# Patient Record
Sex: FEMALE | Race: WHITE | NOT HISPANIC OR LATINO | Employment: UNEMPLOYED | URBAN - METROPOLITAN AREA
[De-identification: names, ages, dates, MRNs, and addresses within clinical notes are randomized per-mention and may not be internally consistent; named-entity substitution may affect disease eponyms.]

---

## 2021-09-11 ENCOUNTER — APPOINTMENT (OUTPATIENT)
Dept: GENERAL RADIOLOGY | Facility: CLINIC | Age: 56
End: 2021-09-11
Attending: EMERGENCY MEDICINE
Payer: MEDICAID

## 2021-09-11 ENCOUNTER — HOSPITAL ENCOUNTER (INPATIENT)
Facility: CLINIC | Age: 56
LOS: 6 days | Discharge: HOME HEALTH | End: 2021-09-17
Attending: EMERGENCY MEDICINE | Admitting: INTERNAL MEDICINE
Payer: MEDICAID

## 2021-09-11 ENCOUNTER — APPOINTMENT (OUTPATIENT)
Dept: ULTRASOUND IMAGING | Facility: CLINIC | Age: 56
End: 2021-09-11
Attending: EMERGENCY MEDICINE
Payer: MEDICAID

## 2021-09-11 DIAGNOSIS — D64.9 ANEMIA, UNSPECIFIED TYPE: ICD-10-CM

## 2021-09-11 DIAGNOSIS — Z76.89 HEALTH CARE HOME: ICD-10-CM

## 2021-09-11 DIAGNOSIS — R18.8 ASCITES OF LIVER: ICD-10-CM

## 2021-09-11 DIAGNOSIS — F10.10 ALCOHOL ABUSE: Primary | ICD-10-CM

## 2021-09-11 DIAGNOSIS — R17 JAUNDICE: ICD-10-CM

## 2021-09-11 DIAGNOSIS — E87.1 HYPONATREMIA: ICD-10-CM

## 2021-09-11 LAB
ALBUMIN SERPL-MCNC: 1.8 G/DL (ref 3.4–5)
ALP SERPL-CCNC: 207 U/L (ref 40–150)
ALT SERPL W P-5'-P-CCNC: 26 U/L (ref 0–50)
AMMONIA PLAS-SCNC: 34 UMOL/L (ref 10–50)
ANION GAP SERPL CALCULATED.3IONS-SCNC: 10 MMOL/L (ref 3–14)
AST SERPL W P-5'-P-CCNC: 83 U/L (ref 0–45)
BASOPHILS # BLD AUTO: 0 10E3/UL (ref 0–0.2)
BASOPHILS NFR BLD AUTO: 0 %
BILIRUB DIRECT SERPL-MCNC: 3.8 MG/DL (ref 0–0.2)
BILIRUB SERPL-MCNC: 6.5 MG/DL (ref 0.2–1.3)
BILIRUB SERPL-MCNC: 6.6 MG/DL (ref 0.2–1.3)
BUN SERPL-MCNC: 7 MG/DL (ref 7–30)
CALCIUM SERPL-MCNC: 8 MG/DL (ref 8.5–10.1)
CHLORIDE BLD-SCNC: 86 MMOL/L (ref 94–109)
CO2 SERPL-SCNC: 21 MMOL/L (ref 20–32)
CREAT SERPL-MCNC: 0.76 MG/DL (ref 0.52–1.04)
EOSINOPHIL # BLD AUTO: 0 10E3/UL (ref 0–0.7)
EOSINOPHIL NFR BLD AUTO: 0 %
ERYTHROCYTE [DISTWIDTH] IN BLOOD BY AUTOMATED COUNT: 15.8 % (ref 10–15)
ETHANOL SERPL-MCNC: <0.01 G/DL
FERRITIN SERPL-MCNC: 2860 NG/ML (ref 8–252)
GFR SERPL CREATININE-BSD FRML MDRD: 89 ML/MIN/1.73M2
GLUCOSE BLD-MCNC: 101 MG/DL (ref 70–99)
HCT VFR BLD AUTO: 24.5 % (ref 35–47)
HGB BLD-MCNC: 8.7 G/DL (ref 11.7–15.7)
HOLD SPECIMEN: NORMAL
IMM GRANULOCYTES # BLD: 0.1 10E3/UL
IMM GRANULOCYTES NFR BLD: 1 %
INR PPP: 1.53 (ref 0.85–1.15)
IRON SATN MFR SERPL: 101 % (ref 15–46)
IRON SERPL-MCNC: 111 UG/DL (ref 35–180)
LIPASE SERPL-CCNC: 256 U/L (ref 73–393)
LYMPHOCYTES # BLD AUTO: 0.7 10E3/UL (ref 0.8–5.3)
LYMPHOCYTES NFR BLD AUTO: 5 %
MAGNESIUM SERPL-MCNC: 2.1 MG/DL (ref 1.6–2.3)
MCH RBC QN AUTO: 37.3 PG (ref 26.5–33)
MCHC RBC AUTO-ENTMCNC: 35.5 G/DL (ref 31.5–36.5)
MCV RBC AUTO: 105 FL (ref 78–100)
MONOCYTES # BLD AUTO: 0.7 10E3/UL (ref 0–1.3)
MONOCYTES NFR BLD AUTO: 6 %
NEUTROPHILS # BLD AUTO: 11.5 10E3/UL (ref 1.6–8.3)
NEUTROPHILS NFR BLD AUTO: 88 %
NRBC # BLD AUTO: 0 10E3/UL
NRBC BLD AUTO-RTO: 0 /100
NT-PROBNP SERPL-MCNC: 584 PG/ML (ref 0–900)
PHOSPHATE SERPL-MCNC: 2.2 MG/DL (ref 2.5–4.5)
PLATELET # BLD AUTO: 200 10E3/UL (ref 150–450)
POTASSIUM BLD-SCNC: 3.9 MMOL/L (ref 3.4–5.3)
PROT SERPL-MCNC: 8.1 G/DL (ref 6.8–8.8)
RBC # BLD AUTO: 2.33 10E6/UL (ref 3.8–5.2)
SARS-COV-2 RNA RESP QL NAA+PROBE: NEGATIVE
SODIUM SERPL-SCNC: 117 MMOL/L (ref 133–144)
T4 FREE SERPL-MCNC: 1.14 NG/DL (ref 0.76–1.46)
TIBC SERPL-MCNC: 110 UG/DL (ref 240–430)
TROPONIN I SERPL-MCNC: <0.015 UG/L (ref 0–0.04)
TSH SERPL DL<=0.005 MIU/L-ACNC: 4.64 MU/L (ref 0.4–4)
WBC # BLD AUTO: 13 10E3/UL (ref 4–11)

## 2021-09-11 PROCEDURE — 84439 ASSAY OF FREE THYROXINE: CPT | Performed by: INTERNAL MEDICINE

## 2021-09-11 PROCEDURE — 82077 ASSAY SPEC XCP UR&BREATH IA: CPT | Performed by: NURSE PRACTITIONER

## 2021-09-11 PROCEDURE — 84484 ASSAY OF TROPONIN QUANT: CPT | Performed by: NURSE PRACTITIONER

## 2021-09-11 PROCEDURE — 99285 EMERGENCY DEPT VISIT HI MDM: CPT | Mod: 25

## 2021-09-11 PROCEDURE — 84100 ASSAY OF PHOSPHORUS: CPT | Performed by: INTERNAL MEDICINE

## 2021-09-11 PROCEDURE — 93005 ELECTROCARDIOGRAM TRACING: CPT

## 2021-09-11 PROCEDURE — 85045 AUTOMATED RETICULOCYTE COUNT: CPT | Performed by: INTERNAL MEDICINE

## 2021-09-11 PROCEDURE — 258N000003 HC RX IP 258 OP 636: Performed by: EMERGENCY MEDICINE

## 2021-09-11 PROCEDURE — 84295 ASSAY OF SERUM SODIUM: CPT | Performed by: INTERNAL MEDICINE

## 2021-09-11 PROCEDURE — 83880 ASSAY OF NATRIURETIC PEPTIDE: CPT | Performed by: NURSE PRACTITIONER

## 2021-09-11 PROCEDURE — 84443 ASSAY THYROID STIM HORMONE: CPT | Performed by: INTERNAL MEDICINE

## 2021-09-11 PROCEDURE — 85610 PROTHROMBIN TIME: CPT | Performed by: EMERGENCY MEDICINE

## 2021-09-11 PROCEDURE — C9803 HOPD COVID-19 SPEC COLLECT: HCPCS

## 2021-09-11 PROCEDURE — 96360 HYDRATION IV INFUSION INIT: CPT

## 2021-09-11 PROCEDURE — 84165 PROTEIN E-PHORESIS SERUM: CPT | Mod: TC | Performed by: PATHOLOGY

## 2021-09-11 PROCEDURE — 82728 ASSAY OF FERRITIN: CPT | Performed by: INTERNAL MEDICINE

## 2021-09-11 PROCEDURE — 82140 ASSAY OF AMMONIA: CPT | Performed by: EMERGENCY MEDICINE

## 2021-09-11 PROCEDURE — 93970 EXTREMITY STUDY: CPT

## 2021-09-11 PROCEDURE — 80053 COMPREHEN METABOLIC PANEL: CPT | Performed by: NURSE PRACTITIONER

## 2021-09-11 PROCEDURE — 36415 COLL VENOUS BLD VENIPUNCTURE: CPT | Performed by: INTERNAL MEDICINE

## 2021-09-11 PROCEDURE — 83550 IRON BINDING TEST: CPT | Performed by: INTERNAL MEDICINE

## 2021-09-11 PROCEDURE — 85025 COMPLETE CBC W/AUTO DIFF WBC: CPT | Performed by: INTERNAL MEDICINE

## 2021-09-11 PROCEDURE — 120N000001 HC R&B MED SURG/OB

## 2021-09-11 PROCEDURE — 76700 US EXAM ABDOM COMPLETE: CPT

## 2021-09-11 PROCEDURE — 99223 1ST HOSP IP/OBS HIGH 75: CPT | Mod: AI | Performed by: INTERNAL MEDICINE

## 2021-09-11 PROCEDURE — 36415 COLL VENOUS BLD VENIPUNCTURE: CPT | Performed by: NURSE PRACTITIONER

## 2021-09-11 PROCEDURE — 82248 BILIRUBIN DIRECT: CPT | Performed by: INTERNAL MEDICINE

## 2021-09-11 PROCEDURE — 84155 ASSAY OF PROTEIN SERUM: CPT | Performed by: INTERNAL MEDICINE

## 2021-09-11 PROCEDURE — 82607 VITAMIN B-12: CPT | Performed by: INTERNAL MEDICINE

## 2021-09-11 PROCEDURE — 85041 AUTOMATED RBC COUNT: CPT | Performed by: NURSE PRACTITIONER

## 2021-09-11 PROCEDURE — HZ2ZZZZ DETOXIFICATION SERVICES FOR SUBSTANCE ABUSE TREATMENT: ICD-10-PCS | Performed by: INTERNAL MEDICINE

## 2021-09-11 PROCEDURE — 83690 ASSAY OF LIPASE: CPT | Performed by: NURSE PRACTITIONER

## 2021-09-11 PROCEDURE — 82746 ASSAY OF FOLIC ACID SERUM: CPT | Performed by: INTERNAL MEDICINE

## 2021-09-11 PROCEDURE — 87635 SARS-COV-2 COVID-19 AMP PRB: CPT | Performed by: EMERGENCY MEDICINE

## 2021-09-11 PROCEDURE — 71045 X-RAY EXAM CHEST 1 VIEW: CPT

## 2021-09-11 PROCEDURE — 83735 ASSAY OF MAGNESIUM: CPT | Performed by: INTERNAL MEDICINE

## 2021-09-11 PROCEDURE — 83930 ASSAY OF BLOOD OSMOLALITY: CPT | Performed by: INTERNAL MEDICINE

## 2021-09-11 RX ORDER — ONDANSETRON 4 MG/1
4 TABLET, ORALLY DISINTEGRATING ORAL EVERY 6 HOURS PRN
Status: DISCONTINUED | OUTPATIENT
Start: 2021-09-11 | End: 2021-09-17 | Stop reason: HOSPADM

## 2021-09-11 RX ORDER — AMOXICILLIN 250 MG
2 CAPSULE ORAL 2 TIMES DAILY PRN
Status: DISCONTINUED | OUTPATIENT
Start: 2021-09-11 | End: 2021-09-17 | Stop reason: HOSPADM

## 2021-09-11 RX ORDER — FLUMAZENIL 0.1 MG/ML
0.2 INJECTION, SOLUTION INTRAVENOUS
Status: DISCONTINUED | OUTPATIENT
Start: 2021-09-11 | End: 2021-09-17 | Stop reason: HOSPADM

## 2021-09-11 RX ORDER — AMOXICILLIN 250 MG
1 CAPSULE ORAL 2 TIMES DAILY PRN
Status: DISCONTINUED | OUTPATIENT
Start: 2021-09-11 | End: 2021-09-17 | Stop reason: HOSPADM

## 2021-09-11 RX ORDER — DIAZEPAM 5 MG
10 TABLET ORAL EVERY 30 MIN PRN
Status: DISCONTINUED | OUTPATIENT
Start: 2021-09-11 | End: 2021-09-17 | Stop reason: HOSPADM

## 2021-09-11 RX ORDER — ACETAMINOPHEN 650 MG/1
650 SUPPOSITORY RECTAL EVERY 6 HOURS PRN
Status: DISCONTINUED | OUTPATIENT
Start: 2021-09-11 | End: 2021-09-17 | Stop reason: HOSPADM

## 2021-09-11 RX ORDER — ONDANSETRON 2 MG/ML
4 INJECTION INTRAMUSCULAR; INTRAVENOUS EVERY 6 HOURS PRN
Status: DISCONTINUED | OUTPATIENT
Start: 2021-09-11 | End: 2021-09-17 | Stop reason: HOSPADM

## 2021-09-11 RX ORDER — LANOLIN ALCOHOL/MO/W.PET/CERES
3 CREAM (GRAM) TOPICAL
Status: DISCONTINUED | OUTPATIENT
Start: 2021-09-11 | End: 2021-09-11

## 2021-09-11 RX ORDER — MULTIPLE VITAMINS W/ MINERALS TAB 9MG-400MCG
1 TAB ORAL DAILY
Status: DISCONTINUED | OUTPATIENT
Start: 2021-09-12 | End: 2021-09-17 | Stop reason: HOSPADM

## 2021-09-11 RX ORDER — POLYETHYLENE GLYCOL 3350 17 G/17G
17 POWDER, FOR SOLUTION ORAL DAILY PRN
Status: DISCONTINUED | OUTPATIENT
Start: 2021-09-11 | End: 2021-09-17 | Stop reason: HOSPADM

## 2021-09-11 RX ORDER — SODIUM CHLORIDE 9 MG/ML
INJECTION, SOLUTION INTRAVENOUS CONTINUOUS
Status: DISCONTINUED | OUTPATIENT
Start: 2021-09-11 | End: 2021-09-11

## 2021-09-11 RX ORDER — FOLIC ACID 1 MG/1
1 TABLET ORAL DAILY
Status: DISCONTINUED | OUTPATIENT
Start: 2021-09-12 | End: 2021-09-17 | Stop reason: HOSPADM

## 2021-09-11 RX ORDER — LANOLIN ALCOHOL/MO/W.PET/CERES
100 CREAM (GRAM) TOPICAL DAILY
Status: COMPLETED | OUTPATIENT
Start: 2021-09-12 | End: 2021-09-16

## 2021-09-11 RX ORDER — DIAZEPAM 10 MG/2ML
5-10 INJECTION, SOLUTION INTRAMUSCULAR; INTRAVENOUS EVERY 30 MIN PRN
Status: DISCONTINUED | OUTPATIENT
Start: 2021-09-11 | End: 2021-09-17 | Stop reason: HOSPADM

## 2021-09-11 RX ORDER — PROCHLORPERAZINE MALEATE 10 MG
10 TABLET ORAL EVERY 6 HOURS PRN
Status: DISCONTINUED | OUTPATIENT
Start: 2021-09-11 | End: 2021-09-17 | Stop reason: HOSPADM

## 2021-09-11 RX ORDER — ACETAMINOPHEN 325 MG/1
650 TABLET ORAL EVERY 6 HOURS PRN
Status: DISCONTINUED | OUTPATIENT
Start: 2021-09-11 | End: 2021-09-17 | Stop reason: HOSPADM

## 2021-09-11 RX ORDER — LIDOCAINE 40 MG/G
CREAM TOPICAL
Status: DISCONTINUED | OUTPATIENT
Start: 2021-09-11 | End: 2021-09-15

## 2021-09-11 RX ORDER — BISACODYL 10 MG
10 SUPPOSITORY, RECTAL RECTAL DAILY PRN
Status: DISCONTINUED | OUTPATIENT
Start: 2021-09-11 | End: 2021-09-17 | Stop reason: HOSPADM

## 2021-09-11 RX ORDER — PROCHLORPERAZINE 25 MG
25 SUPPOSITORY, RECTAL RECTAL EVERY 12 HOURS PRN
Status: DISCONTINUED | OUTPATIENT
Start: 2021-09-11 | End: 2021-09-17 | Stop reason: HOSPADM

## 2021-09-11 RX ADMIN — SODIUM CHLORIDE 1000 ML: 9 INJECTION, SOLUTION INTRAVENOUS at 19:05

## 2021-09-11 ASSESSMENT — ACTIVITIES OF DAILY LIVING (ADL)
DRESSING/BATHING_DIFFICULTY: NO
PATIENT_/_FAMILY_COMMUNICATION_STYLE: SPOKEN LANGUAGE (ENGLISH OR BILINGUAL)
FALL_HISTORY_WITHIN_LAST_SIX_MONTHS: YES
TOILETING_ISSUES: NO
DIFFICULTY_COMMUNICATING: NO
VISION_MANAGEMENT: GLASSES
DIFFICULTY_EATING/SWALLOWING: NO
CONCENTRATING,_REMEMBERING_OR_MAKING_DECISIONS_DIFFICULTY: NO
NUMBER_OF_TIMES_PATIENT_HAS_FALLEN_WITHIN_LAST_SIX_MONTHS: 2
DOING_ERRANDS_INDEPENDENTLY_DIFFICULTY: NO
WHICH_OF_THE_ABOVE_FUNCTIONAL_RISKS_HAD_A_RECENT_ONSET_OR_CHANGE?: AMBULATION
WALKING_OR_CLIMBING_STAIRS_DIFFICULTY: NO
HEARING_DIFFICULTY_OR_DEAF: NO
WEAR_GLASSES_OR_BLIND: YES

## 2021-09-11 ASSESSMENT — ENCOUNTER SYMPTOMS
WEAKNESS: 1
ABDOMINAL PAIN: 0
ABDOMINAL DISTENTION: 1
COUGH: 1
VOMITING: 0
DIARRHEA: 0

## 2021-09-11 ASSESSMENT — MIFFLIN-ST. JEOR: SCORE: 1342.51

## 2021-09-11 NOTE — ED TRIAGE NOTES
"Pt here with her s/o for abd bloating, occasional cough, vision changes for a month and a half, weakness and bilateral leg swelling . Pt stated  She does drink \" more than she should \" told me she cut way back and now has 4 beers a day .   "

## 2021-09-11 NOTE — ED PROVIDER NOTES
History   Chief Complaint:  Leg swelling and abdominal distention      HPI   Danyelle Bustillo is a 55 year old female who presents with leg swelling and abdominal distention. The patient states that she has been experiencing leg swelling mainly on her left leg for the past 2 months. She mentions that the swelling reduces when she elevates her legs on her bed. She also states that she has been feeling weakness in her legs. The patient had a recent fall while picking something up and lost her balance. Denies any head injuries or syncope from the fall. She has also been experiencing abdomen distention for the past 3-4 weeks. She has had a minimal cough for the past 2 weeks. The patient also mentions that she has been having visual disturbances when she she steps inside and states that her eyes have a hard time adjusting after being outside. Denies any abdominal pain, vomiting, diarrhea, or tobacco use. Patient states that she was a heavy alcohol drinker but has reduced her consumption to 3-4 beers a day. Denies any liver issues.     Review of Systems   Eyes: Positive for visual disturbance.   Respiratory: Positive for cough.    Cardiovascular: Positive for leg swelling.   Gastrointestinal: Positive for abdominal distention. Negative for abdominal pain, diarrhea and vomiting.   Neurological: Positive for weakness. Negative for syncope.   All other systems reviewed and are negative.    Allergies:  The patient has no known allergies.     Medications:  The patient is not currently taking any prescribed medications.    Past Medical History:    The patient denies past medical history      Social History:  Patient presents with . Denies tobacco use. Patient does drink 3-4 beers per day    Physical Exam     Patient Vitals for the past 24 hrs:   BP Temp Temp src Pulse Resp SpO2 Height Weight   09/11/21 2000 112/64 -- -- (!) 121 23 94 % -- --   09/11/21 1930 112/62 -- -- 117 21 98 % -- --   09/11/21 1900 130/77 -- -- (!)  "123 -- 97 % -- --   09/11/21 1850 (!) 126/90 -- -- (!) 126 -- 100 % -- --   09/11/21 1700 129/84 -- -- (!) 129 -- 96 % -- --   09/11/21 1612 133/80 97.2  F (36.2  C) Temporal (!) 132 18 100 % 1.676 m (5' 6\") 72.6 kg (160 lb)       Physical Exam  Nursing note and vitals reviewed.  Constitutional:  Appears well-developed and well-nourished.   HENT:   Head:    Atraumatic.   Mouth/Throat:   Oropharynx is clear and moist. No oropharyngeal exudate.   Eyes:    Pupils are equal, round, and reactive to light. Positive scleral icterus   Neck:    Normal range of motion. Neck supple.      No tracheal deviation present. No thyromegaly present.   Cardiovascular:  Normal rate, regular rhythm, no murmur   Pulmonary/Chest: Breath sounds are clear and equal without wheezes or crackles.  Abdominal:   Abdomen is moderatlely distended but not tense. No palpable mass or organ enlargement. There is no tenderness.       There is no rebound and no guarding.   Musculoskeletal:  1+ pitting edema to right leg. 2+ pitting edema to left leg.   Lymphadenopathy:  No cervical adenopathy.   Neurological:   Alert and oriented to person, place, and time.   Skin:    Mild jaundice. Skin is warm and dry. No rash noted. No pallor.     Emergency Department Course   ECG  ECG taken at 1735, ECG read at 1749  Sinus tachycardia  Low voltage QRS  Nonspecific T wave abnormality    Rate 123 bpm. MT interval 132 ms. QRS duration 74 ms. QT/QTc 316/452 ms. P-R-T axes 56 51 14.     Imaging:  Abdomen US   1.  Fatty infiltration liver with nodular liver contour concerning for  cirrhosis. Moderate ascites.     2.  Cholelithiasis with gallbladder wall thickening, nonspecific in  the setting of ascites. Follow-up hepatobiliary scan could be  performed to assess for cystic and common bile duct patency.     Per radiology     US lower extremity venous  No deep venous thrombosis in the bilateral lower extremities.  Per radiology     XR chest port  Portable chest. Lungs are " clear. Heart is normal in size.  No pneumothorax. No definite pleural effusions  Per radiology     Laboratory:  Alcohol level: <0.01    Symptomatic Influenza A/B & SARS-CoV2 (COVID19) Virus PCR Multiplex: negative     Ammonia: 34    Troponin (Collected 1725): <0.015    BNP: 584    Lipase: 256    INR: 1.53 (H)    CMP: sodium (L), chloride 86 (L), chinmay 8 (L), glucose 101 (H), alkaline phosphatase 207 (H), AST 83 (H), albumin 1.8 (L), bilirubin 6.6 (H) o/w WNL (Creatinine 0.76)    CBC: WBC 13 (H), HGB 8.7 (L),      Emergency Department Course:    Reviewed:  I reviewed nursing notes, vitals and past medical history    Assessments:  1707 I obtained history and examined the patient as noted above.   1932 I rechecked the patient and explained findings.     Consults:   1940 I spoke with Dr. Ray, hospitalist, about the patient's findings and will be accepting the patient for admission.     Interventions:  1905 Sodium chloride 0.9 % infusion, 1000 mL, IV    Disposition:  The patient was admitted to the hospital under the care of Dr. Ray.       Impression & Plan     Medical Decision Making:  I found this patient to have significant hyponatremia as well as jaundice and volume ascites which is of unclear etiology at this point, but could be due to alcoholic liver disease/cirrhosis or alternative cause.  She was placed on a low maintenance rate of normal saline for treatment for her hyponatremia and admitted under the care of the hospitalist Dr. Ray for further evaluation and treatment.  There was no sign of bacterial infection or sepsis at this time.  She has anemia but this is likely chronic and not due to any active bleeding.    Covid-19  Danyelle Bustillo was evaluated during a global COVID-19 pandemic, which necessitated consideration that the patient might be at risk for infection with the SARS-CoV-2 virus that causes COVID-19.   Applicable protocols for evaluation were followed during the patient's  care.     Diagnosis:    ICD-10-CM    1. Hyponatremia  E87.1    2. Ascites of liver  R18.8    3. Jaundice  R17    4. Anemia, unspecified type  D64.9        Scribe Disclosure:  I, Elysia Lacey, am serving as a scribe at 5:07 PM on 9/11/2021 to document services personally performed by Jessica Moffett MD based on my observations and the provider's statements to me.        Jessica Moffett MD  09/12/21 0034

## 2021-09-12 ENCOUNTER — ANESTHESIA (OUTPATIENT)
Dept: MEDSURG UNIT | Facility: CLINIC | Age: 56
End: 2021-09-12
Payer: MEDICAID

## 2021-09-12 ENCOUNTER — APPOINTMENT (OUTPATIENT)
Dept: PHYSICAL THERAPY | Facility: CLINIC | Age: 56
End: 2021-09-12
Attending: INTERNAL MEDICINE
Payer: MEDICAID

## 2021-09-12 ENCOUNTER — ANESTHESIA EVENT (OUTPATIENT)
Dept: MEDSURG UNIT | Facility: CLINIC | Age: 56
End: 2021-09-12
Payer: MEDICAID

## 2021-09-12 ENCOUNTER — APPOINTMENT (OUTPATIENT)
Dept: OCCUPATIONAL THERAPY | Facility: CLINIC | Age: 56
End: 2021-09-12
Attending: INTERNAL MEDICINE
Payer: MEDICAID

## 2021-09-12 LAB
ALBUMIN SERPL-MCNC: 1.4 G/DL (ref 3.4–5)
ALP SERPL-CCNC: 168 U/L (ref 40–150)
ALT SERPL W P-5'-P-CCNC: 20 U/L (ref 0–50)
ANION GAP SERPL CALCULATED.3IONS-SCNC: 10 MMOL/L (ref 3–14)
AST SERPL W P-5'-P-CCNC: 69 U/L (ref 0–45)
ATRIAL RATE - MUSE: 123 BPM
BASOPHILS # BLD AUTO: 0 10E3/UL (ref 0–0.2)
BASOPHILS NFR BLD AUTO: 0 %
BILIRUB SERPL-MCNC: 5.7 MG/DL (ref 0.2–1.3)
BUN SERPL-MCNC: 7 MG/DL (ref 7–30)
CALCIUM SERPL-MCNC: 7.5 MG/DL (ref 8.5–10.1)
CHLORIDE BLD-SCNC: 90 MMOL/L (ref 94–109)
CHOLEST SERPL-MCNC: 91 MG/DL
CO2 SERPL-SCNC: 21 MMOL/L (ref 20–32)
CREAT SERPL-MCNC: 0.67 MG/DL (ref 0.52–1.04)
DIASTOLIC BLOOD PRESSURE - MUSE: NORMAL MMHG
EOSINOPHIL # BLD AUTO: 0 10E3/UL (ref 0–0.7)
EOSINOPHIL NFR BLD AUTO: 0 %
ERYTHROCYTE [DISTWIDTH] IN BLOOD BY AUTOMATED COUNT: 15.8 % (ref 10–15)
ERYTHROCYTE [DISTWIDTH] IN BLOOD BY AUTOMATED COUNT: 15.9 % (ref 10–15)
FOLATE SERPL-MCNC: 3.8 NG/ML
GFR SERPL CREATININE-BSD FRML MDRD: >90 ML/MIN/1.73M2
GLUCOSE BLD-MCNC: 79 MG/DL (ref 70–99)
HCT VFR BLD AUTO: 20.4 % (ref 35–47)
HCT VFR BLD AUTO: 20.6 % (ref 35–47)
HDLC SERPL-MCNC: 23 MG/DL
HGB BLD-MCNC: 7.4 G/DL (ref 11.7–15.7)
HGB BLD-MCNC: 7.6 G/DL (ref 11.7–15.7)
IMM GRANULOCYTES # BLD: 0.1 10E3/UL
IMM GRANULOCYTES NFR BLD: 1 %
INR PPP: 1.68 (ref 0.85–1.15)
INTERPRETATION ECG - MUSE: NORMAL
LDLC SERPL CALC-MCNC: 54 MG/DL
LYMPHOCYTES # BLD AUTO: 1.1 10E3/UL (ref 0.8–5.3)
LYMPHOCYTES NFR BLD AUTO: 10 %
MAGNESIUM SERPL-MCNC: 2.1 MG/DL (ref 1.6–2.3)
MCH RBC QN AUTO: 37.8 PG (ref 26.5–33)
MCH RBC QN AUTO: 38.4 PG (ref 26.5–33)
MCHC RBC AUTO-ENTMCNC: 36.3 G/DL (ref 31.5–36.5)
MCHC RBC AUTO-ENTMCNC: 36.9 G/DL (ref 31.5–36.5)
MCV RBC AUTO: 104 FL (ref 78–100)
MCV RBC AUTO: 104 FL (ref 78–100)
MONOCYTES # BLD AUTO: 0.8 10E3/UL (ref 0–1.3)
MONOCYTES NFR BLD AUTO: 7 %
NEUTROPHILS # BLD AUTO: 9.3 10E3/UL (ref 1.6–8.3)
NEUTROPHILS NFR BLD AUTO: 82 %
NONHDLC SERPL-MCNC: 68 MG/DL
NRBC # BLD AUTO: 0 10E3/UL
NRBC BLD AUTO-RTO: 0 /100
OSMOLALITY SERPL: 244 MMOL/KG (ref 275–295)
OSMOLALITY UR: 220 MMOL/KG (ref 100–1200)
P AXIS - MUSE: 56 DEGREES
PHOSPHATE SERPL-MCNC: 2.4 MG/DL (ref 2.5–4.5)
PLATELET # BLD AUTO: 161 10E3/UL (ref 150–450)
PLATELET # BLD AUTO: 169 10E3/UL (ref 150–450)
POTASSIUM BLD-SCNC: 4.1 MMOL/L (ref 3.4–5.3)
PR INTERVAL - MUSE: 132 MS
PROT SERPL-MCNC: 6.7 G/DL (ref 6.8–8.8)
QRS DURATION - MUSE: 74 MS
QT - MUSE: 316 MS
QTC - MUSE: 452 MS
R AXIS - MUSE: 51 DEGREES
RBC # BLD AUTO: 1.96 10E6/UL (ref 3.8–5.2)
RBC # BLD AUTO: 1.98 10E6/UL (ref 3.8–5.2)
RETICS # AUTO: 0.07 10E6/UL (ref 0.03–0.1)
RETICS/RBC NFR AUTO: 3.6 % (ref 0.5–2)
SODIUM SERPL-SCNC: 118 MMOL/L (ref 133–144)
SODIUM SERPL-SCNC: 119 MMOL/L (ref 133–144)
SODIUM SERPL-SCNC: 121 MMOL/L (ref 133–144)
SODIUM SERPL-SCNC: 121 MMOL/L (ref 133–144)
SODIUM UR-SCNC: <5 MMOL/L
SYSTOLIC BLOOD PRESSURE - MUSE: NORMAL MMHG
T AXIS - MUSE: 14 DEGREES
TOTAL PROTEIN SERUM FOR ELP: 6.6 G/DL (ref 6.8–8.8)
TRIGL SERPL-MCNC: 69 MG/DL
VENTRICULAR RATE- MUSE: 123 BPM
VIT B12 SERPL-MCNC: 1306 PG/ML (ref 193–986)
WBC # BLD AUTO: 10.2 10E3/UL (ref 4–11)
WBC # BLD AUTO: 11.3 10E3/UL (ref 4–11)

## 2021-09-12 PROCEDURE — 97535 SELF CARE MNGMENT TRAINING: CPT | Mod: GO

## 2021-09-12 PROCEDURE — 99233 SBSQ HOSP IP/OBS HIGH 50: CPT | Performed by: STUDENT IN AN ORGANIZED HEALTH CARE EDUCATION/TRAINING PROGRAM

## 2021-09-12 PROCEDURE — 85027 COMPLETE CBC AUTOMATED: CPT | Performed by: INTERNAL MEDICINE

## 2021-09-12 PROCEDURE — 80069 RENAL FUNCTION PANEL: CPT | Performed by: INTERNAL MEDICINE

## 2021-09-12 PROCEDURE — 36415 COLL VENOUS BLD VENIPUNCTURE: CPT | Performed by: INTERNAL MEDICINE

## 2021-09-12 PROCEDURE — 258N000003 HC RX IP 258 OP 636: Performed by: STUDENT IN AN ORGANIZED HEALTH CARE EDUCATION/TRAINING PROGRAM

## 2021-09-12 PROCEDURE — 97116 GAIT TRAINING THERAPY: CPT | Mod: GP | Performed by: PHYSICAL THERAPIST

## 2021-09-12 PROCEDURE — 84100 ASSAY OF PHOSPHORUS: CPT | Performed by: INTERNAL MEDICINE

## 2021-09-12 PROCEDURE — 97161 PT EVAL LOW COMPLEX 20 MIN: CPT | Mod: GP | Performed by: PHYSICAL THERAPIST

## 2021-09-12 PROCEDURE — 93005 ELECTROCARDIOGRAM TRACING: CPT

## 2021-09-12 PROCEDURE — 250N000013 HC RX MED GY IP 250 OP 250 PS 637: Performed by: STUDENT IN AN ORGANIZED HEALTH CARE EDUCATION/TRAINING PROGRAM

## 2021-09-12 PROCEDURE — 97530 THERAPEUTIC ACTIVITIES: CPT | Mod: GP | Performed by: PHYSICAL THERAPIST

## 2021-09-12 PROCEDURE — 84300 ASSAY OF URINE SODIUM: CPT | Performed by: INTERNAL MEDICINE

## 2021-09-12 PROCEDURE — 84166 PROTEIN E-PHORESIS/URINE/CSF: CPT | Mod: TC | Performed by: PATHOLOGY

## 2021-09-12 PROCEDURE — 85610 PROTHROMBIN TIME: CPT | Performed by: INTERNAL MEDICINE

## 2021-09-12 PROCEDURE — 84295 ASSAY OF SERUM SODIUM: CPT | Performed by: INTERNAL MEDICINE

## 2021-09-12 PROCEDURE — 370N000003 HC ANESTHESIA WARD SERVICE

## 2021-09-12 PROCEDURE — 120N000001 HC R&B MED SURG/OB

## 2021-09-12 PROCEDURE — 36415 COLL VENOUS BLD VENIPUNCTURE: CPT | Performed by: STUDENT IN AN ORGANIZED HEALTH CARE EDUCATION/TRAINING PROGRAM

## 2021-09-12 PROCEDURE — 82465 ASSAY BLD/SERUM CHOLESTEROL: CPT | Performed by: INTERNAL MEDICINE

## 2021-09-12 PROCEDURE — 83735 ASSAY OF MAGNESIUM: CPT | Performed by: INTERNAL MEDICINE

## 2021-09-12 PROCEDURE — 83935 ASSAY OF URINE OSMOLALITY: CPT | Performed by: INTERNAL MEDICINE

## 2021-09-12 PROCEDURE — 84295 ASSAY OF SERUM SODIUM: CPT | Performed by: STUDENT IN AN ORGANIZED HEALTH CARE EDUCATION/TRAINING PROGRAM

## 2021-09-12 PROCEDURE — 97165 OT EVAL LOW COMPLEX 30 MIN: CPT | Mod: GO

## 2021-09-12 PROCEDURE — 250N000013 HC RX MED GY IP 250 OP 250 PS 637: Performed by: INTERNAL MEDICINE

## 2021-09-12 RX ORDER — SODIUM CHLORIDE 9 MG/ML
INJECTION, SOLUTION INTRAVENOUS CONTINUOUS
Status: DISCONTINUED | OUTPATIENT
Start: 2021-09-12 | End: 2021-09-14

## 2021-09-12 RX ADMIN — THIAMINE HCL TAB 100 MG 100 MG: 100 TAB at 09:24

## 2021-09-12 RX ADMIN — POTASSIUM & SODIUM PHOSPHATES POWDER PACK 280-160-250 MG 1 PACKET: 280-160-250 PACK at 16:18

## 2021-09-12 RX ADMIN — POTASSIUM & SODIUM PHOSPHATES POWDER PACK 280-160-250 MG 1 PACKET: 280-160-250 PACK at 21:36

## 2021-09-12 RX ADMIN — MULTIPLE VITAMINS W/ MINERALS TAB 1 TABLET: TAB at 09:24

## 2021-09-12 RX ADMIN — DIAZEPAM 10 MG: 5 TABLET ORAL at 16:18

## 2021-09-12 RX ADMIN — FOLIC ACID 1 MG: 1 TABLET ORAL at 09:23

## 2021-09-12 RX ADMIN — SODIUM CHLORIDE: 9 INJECTION, SOLUTION INTRAVENOUS at 14:29

## 2021-09-12 ASSESSMENT — MIFFLIN-ST. JEOR: SCORE: 1322.75

## 2021-09-12 ASSESSMENT — ACTIVITIES OF DAILY LIVING (ADL)
ADLS_ACUITY_SCORE: 21

## 2021-09-12 NOTE — PROGRESS NOTES
[facility]    Progress Note- Hospitalist Service       Date of Admission:  9/11/2021    Assessment & Plan   Danyelle Bustillo is a 55 year old female with hx of alcohol abuse and lack of medical care who was admitted on 9/11/2021 for hyponatremia (Na 117) and painless jaundice    Hyponatremia,   * Presented for evaluation following fall, which patient attributes to generalized weakness. In ED noted to have decompensated liver failure with jaundice, BLE edema, and ascites noted on exam. Found to have severe hyponatremia with Na 117. She was given IV fluids initially and then further fluids held.      - Na initially improved to 121 on 6am check this morning however back down to 118 later. With urine Na <5 will plan to restart NS at 75cc/hr and trend Na q6h. Would recommended 09/13 6am Na check to be <129 which would be 8 point in 24 hours. If Na is elevating too quickly likely will need adjustment of IVF rate.  - Urine osmols are low at 220 but not <100 however Na<5  - Suspect that patient has two etiologies for her hyponatremia including liver failure and beer potomania.   - for her liver failure would like to avoid IVF however it appears she may need some minimal supplementation initially to get her Na normalized  - follow results of imaging ordered on admission    Elevated transaminases  New diagnosis of decompensated liver cirrhosis  AST 83, ALT 26, Alk-P 207. Tbili 6.6.   - Abdominal US with cirrhotic appearance to liver and cholelithiasis, no CBD dilation.  - in light imaging findings with elevated INR, low platelets and albumin suspect patient has developed cirrhosis, she was informed of this diagnosis today    Alcohol use disorder  Long-standing history of heavy alcohol use; was consuming on average 6-8 beers daily, currently consuming 3-4 beers daily. Last drink: 9/10. She has no known history of alcohol withdrawal. She remains tachycardic.  - On CIWA protocol with diazepam  - On  thiamine, folate, MVI  - On K/Mg/Phos replacement protocols  - patient decline CD consult today and told me she can stop on her own    Anemia, unclear acute vs chronic  Hgb 8.7 from unknown baseline. No history to suggest active bleed  - down to 7.6 today, no signs of bleeding  - B12/folate, iron studies, peripheral smear, SPEP/UPEP ordered and remain pending    Mechanical fall due to generalized weakness, physical deconditioning  Severe protein calorie malnutrition in context of acute illness  - Work-up as noted above  - PT/OT,Nutrition consultS requested    Diet: regular diet  DVT Prophylaxis: Pneumatic Compression Devices  James Catheter: Not present  Code Status: Full Code FULL CODE      Disposition: Expected discharge once sodium >130, hyponatremia work-up complete, and PT/OT eval, 3-4+ days    Cyndi Ball [facility]  Contact information available via [facility] Paging/Directory      ______________________________________________________________________    Interval Hx  Patient reports she is feeling better. She denies nausea. Her main complaint to be was bloating in her abdomen. No major pain anywhere. Breathing is stable. No N/v/d. She was informed of cirrhosis and need to stop alcohol consumption. She was not anxious and no tremor noted.    Review of Systems    10 point Review of Systems was reviewed and pertinent positives and negatives are noted in the HPI      Prior to Admission Medications   None     Allergies   No Known Allergies    Physical Exam   Vital Signs: Temp: 98.6  F (37  C) Temp src: Oral BP: 109/71 Pulse: 115   Resp: 16 SpO2: 95 % O2 Device: None (Room air)    Weight: 155 lbs 10.32 oz   Constitutional: Awake, alert, cooperative, no apparent distress  Respiratory: Clear to auscultation bilaterally, no crackles or wheezing  HEENT: noted scleral icterus, EOMI  Cardiovascular: tachycardic rate and normal rhythm, normal S1 and S2, and no murmur noted  GI: Normal bowel  sounds, soft,distended, not tender no obvious fluid wave  Skin/Integumen: No rashes, no cyanosis, +2-3 pitting edema in LE  No tremor noted       Data   Data reviewed today: I reviewed all medications, new labs and imaging results over the last 24 hours. I personally reviewed the chest x-ray image(s) showing no acute infiltrates.    Recent Labs   Lab 09/12/21  0626 09/11/21  2345 09/11/21  2311 09/11/21  1725   WBC 10.2 11.3*  --  13.0*   HGB 7.6* 7.4*  --  8.7*   * 104*  --  105*    161  --  200   INR 1.68*  --   --  1.53*   *  121*  --  118* 117*   POTASSIUM 4.1  --   --  3.9   CHLORIDE 90*  --   --  86*   CO2 21  --   --  21   BUN 7  --   --  7   CR 0.67  --   --  0.76   ANIONGAP 10  --   --  10   BRIANA 7.5*  --   --  8.0*   GLC 79  --   --  101*   ALBUMIN 1.4*  --   --  1.8*   PROTTOTAL 6.7*  --   --  8.1   BILITOTAL 5.7*  --   --  6.5*  6.6*   ALKPHOS 168*  --   --  207*   ALT 20  --   --  26   AST 69*  --   --  83*   LIPASE  --   --   --  256   TROPONIN  --   --   --  <0.015         Recent Results (from the past 24 hour(s))   XR Chest Port 1 View    Narrative    XR PORTABLE CHEST ONE VIEW   9/11/2021 6:32 PM     HISTORY: Cough, check for pneumonia.    COMPARISON: None.      Impression    IMPRESSION: Portable chest. Lungs are clear. Heart is normal in size.  No pneumothorax. No definite pleural effusions.    ELA BERNARD MD         SYSTEM ID:  [id number]   US Lower Extremity Venous Duplex Bilateral    Narrative    US BILATERAL LOWER EXTREMITY VENOUS DUPLEX ULTRASOUND  9/11/2021 8:51  PM    CLINICAL HISTORY: Check for deep vein thrombosis, bilateral leg  swelling.  TECHNIQUE: Venous Duplex ultrasound of bilateral lower extremities  with and without compression, augmentation and duplex. Color flow and  spectral Doppler with waveform analysis performed.    COMPARISON: None.    FINDINGS: Exam includes the common femoral, femoral, popliteal veins  as well as segmentally visualized deep calf veins  and greater  saphenous vein.     RIGHT: No deep vein thrombosis. No superficial thrombophlebitis. No  popliteal cyst.    LEFT: No deep vein thrombosis. No superficial thrombophlebitis. No  popliteal cyst.      Impression    IMPRESSION:  No deep venous thrombosis in the bilateral lower extremities.    ELA BERNARD MD         SYSTEM ID:  [id number]   Abdomen US, complete    Narrative    US ABDOMEN COMPLETE 9/11/2021 9:19 PM    CLINICAL HISTORY: Evaluate for ascites, liver disease, new jaundice,  abdominal distention and bilateral leg swelling.    TECHNIQUE: Complete abdominal ultrasound.    COMPARISON: None.    FINDINGS:    GALLBLADDER: Gallstones are present in the gallbladder. Gallbladder  wall thickening measures 4 mm but is nonspecific in the setting of  moderate ascites.    BILE DUCTS: No biliary dilatation. The common duct measures 5 mm.    LIVER: Lobulated liver contour concerning for cirrhosis. No focal  mass. Increased echogenicity of the liver is consistent with fatty  infiltration.    RIGHT KIDNEY: Normal size. Normal echogenicity with no hydronephrosis  or mass.     LEFT KIDNEY: Normal size. Normal echogenicity with no hydronephrosis  or mass.     SPLEEN: Normal.    PANCREAS: The visualized portions are normal.    AORTA: Normal in caliber.     IVC: Normal where visualized.    Moderate ascites.      Impression    IMPRESSION:  1.  Fatty infiltration liver with nodular liver contour concerning for  cirrhosis. Moderate ascites.    2.  Cholelithiasis with gallbladder wall thickening, nonspecific in  the setting of ascites. Follow-up hepatobiliary scan could be  performed to assess for cystic and common bile duct patency.    US LI-RADS Category: US-1 Negative.    ELA BERNARD MD         SYSTEM ID:  [id number]

## 2021-09-12 NOTE — PROGRESS NOTES
09/12/21 1658   Quick Adds   Type of Visit Initial PT Evaluation   Living Environment   People in home significant other   Current Living Arrangements house   Home Accessibility stairs within home;stairs to enter home   Number of Stairs, Main Entrance 4   Stair Railings, Main Entrance railings safe and in good condition   Number of Stairs, Within Home, Primary greater than 10 stairs   Stair Railings, Within Home, Primary railings safe and in good condition   Transportation Anticipated car, drives self;family or friend will provide   Living Environment Comments s.o. works outside the home   Self-Care   Usual Activity Tolerance good   Current Activity Tolerance fair   Regular Exercise No   Equipment Currently Used at Home hospital bed;grab bar, tub/shower   Disability/Function   Hearing Difficulty or Deaf no   Wear Glasses or Blind yes   Concentrating, Remembering or Making Decisions Difficulty no   Difficulty Communicating no   Difficulty Eating/Swallowing no   Walking or Climbing Stairs Difficulty yes   Mobility Management was not using AD but was having increased difficulty with mobility   Dressing/Bathing Difficulty no   Toileting issues no   Doing Errands Independently Difficulty (such as shopping) no   Fall history within last six months yes   Number of times patient has fallen within last six months 2   Change in Functional Status Since Onset of Current Illness/Injury yes   General Information   Onset of Illness/Injury or Date of Surgery 09/11/21   Referring Physician Dr Devorah Ray   Patient/Family Therapy Goals Statement (PT) home when able   Pertinent History of Current Problem (include personal factors and/or comorbidities that impact the POC) Pt admitted with hyponatremia and jaundice, fall, ascited. Has PMH etoh abuse and lack of medical care.   Existing Precautions/Restrictions fall   Cognition   Orientation Status (Cognition) oriented x 3   Affect/Mental Status (Cognition) WFL   Follows Commands  (Cognition) WFL   Safety Deficit (Cognition) minimal deficit   Cognitive Status Comments defer to OT but appears cognitively intact to conversation although needs slow, detailed cues for mobility and seems a bit forgetful/needs reminders to watch out for IV line, etc   Pain Assessment   Patient Currently in Pain No   Integumentary/Edema   Integumentary/Edema Comments Pt with very distended abdomen d/t ascites   Posture    Posture Not impaired   Range of Motion (ROM)   ROM Quick Adds ROM WFL   Strength   Manual Muscle Testing Quick Adds Deficits observed during functional mobility   Strength Comments LE strength grossly antigravity   Bed Mobility   Comment (Bed Mobility) supine to/from sit slow and with rail   Transfers   Transfer Safety Comments sit to/from stand with CGA and cues for hand placement   Gait/Stairs (Locomotion)   Epworth Level (Gait) contact guard   Assistive Device (Gait)   (IV pole)   Distance in Feet (Required for LE Total Joints) 60'   Pattern (Gait) step-through   Deviations/Abnormal Patterns (Gait) base of support, wide;gait speed decreased   Comment (Gait/Stairs) Pt is unsteady with gait, reaching for IV pole and hallway rail, etc. Able to amb with CGA and IV pole, brought walker to room to use next time and ecouraged pt to get up with nursing and walk in [place] at least once this evening.   Balance   Balance Comments unsteady standing balance   Sensory Examination   Sensory Perception patient reports no sensory changes   Coordination   Coordination no deficits were identified   Muscle Tone   Muscle Tone no deficits were identified   Clinical Impression   Criteria for Skilled Therapeutic Intervention yes, treatment indicated   PT Diagnosis (PT) impaired functional mobility   Influenced by the following impairments decreased strength, decreased balance, genl deconditioning   Functional limitations due to impairments fall risk, decreased IND with all mobility   Clinical Presentation  Stable/Uncomplicated   Clinical Presentation Rationale per medical record   Clinical Decision Making (Complexity) low complexity   Therapy Frequency (PT) Daily   Predicted Duration of Therapy Intervention (days/wks) 5 days   Planned Therapy Interventions (PT) balance training;gait training;home exercise program;stair training;strengthening   Anticipated Equipment Needs at Discharge (PT) walker, rolling   Risk & Benefits of therapy have been explained evaluation/treatment results reviewed   PT Discharge Planning    PT Discharge Recommendation (DC Rec) home with home care physical therapy;home with assist   PT Rationale for DC Rec Pt is normally IND at home, lives with long time s.o. who works outside the home, stairs to enter then can stay on one level. Pt is well below her baseline mobility, currently unsteady with gait and needs AD for ambulation, SBA for transfers. Anticipate with medical management and therapies she will be able to discharge directly home with home PT and assist from s.o., however if pt does not improve would recommend TCU. Will cont to assess daily.   PT Brief overview of current status  Pt transfers with SBA, amb 60' with IV pole and CGA, unsteady   Total Evaluation Time   Total Evaluation Time (Minutes) 15

## 2021-09-12 NOTE — PROGRESS NOTES
Admission    Patient arrives to room 615-01 via cart from ED.  Care plan note: A & O x 4, pleasant; VSS on RA. Denies pain. No skin issues identified except + 2 edema in LE bilaterally. Jaundice in appearance. Belongings remain with patient. Admission questions completed    Inpatient nursing criteria listed below were met:    Did you put disposition on whiteboard and in sticky note: Yes  Full skin assessment done (add LDA if skin issue present) :Yes  Isolation education started/completed Yes  Patient allergies verified with patient: Yes  Fall Risk? (Care plan updated, Education given and documented) Yes  Primary Care Plan initiated: Yes  Home medications documented in belongings flowsheet: Yes  Patient belongings documented in belongings flowsheet: Yes  Reminder note (belongings/ medications) placed in discharge instructions:Yes  Admission profile/ required documentation complete: Yes  If patient is a 72 hour hold/Commitment are belongings removed from room and locked up? NELLY Kearns RN on 9/11/2021 at 10:47 PM

## 2021-09-12 NOTE — ED NOTES
"[facility]  ED Nurse Handoff Report    ED Chief complaint: Leg Swelling, Generalized Weakness, and Bloated (pt looks jaundiced )      ED Diagnosis:   Final diagnoses:   None       Code Status: Not addressed, confirm with hospitalist    Allergies: No Known Allergies    Patient Story: Pt from home where she reports bloating and increased leg swelling. Pt states she is a daily drinker. Last ETOH intake last evening and has \"cut down\" to approx 4 beers per day.    Focused Assessment:  Pt states she has not seen a doctor for \"25 years\". Pt is jaundiced with distended abdomen and bilateral lower leg edema. Denies chest pain, reports occasional cough but denies shortness of breath. Pt alert and oriented, pleasant and cooperative.     Treatments and/or interventions provided: Labs, EKG, CXR, US pending, monitoring, fluids  Labs Ordered and Resulted from Time of ED Arrival Up to the Time of Departure from the ED   COMPREHENSIVE METABOLIC PANEL - Abnormal; Notable for the following components:       Result Value    Sodium 117 (*)     Chloride 86 (*)     Calcium 8.0 (*)     Glucose 101 (*)     Alkaline Phosphatase 207 (*)     AST 83 (*)     Albumin 1.8 (*)     Bilirubin Total 6.6 (*)     All other components within normal limits   CBC WITH PLATELETS AND DIFFERENTIAL - Abnormal; Notable for the following components:    WBC Count 13.0 (*)     RBC Count 2.33 (*)     Hemoglobin 8.7 (*)     Hematocrit 24.5 (*)      (*)     MCH 37.3 (*)     RDW 15.8 (*)     Absolute Neutrophils 11.5 (*)     Absolute Lymphocytes 0.7 (*)     Absolute Immature Granulocytes 0.1 (*)     All other components within normal limits   LIPASE - Normal   ETHYL ALCOHOL LEVEL - Normal   TROPONIN I - Normal   NT PROBNP INPATIENT - Normal   AMMONIA - Normal   CBC WITH PLATELETS & DIFFERENTIAL    Narrative:     The following orders were created for panel order CBC with platelets + differential.  Procedure                               " Abnormality         Status                     ---------                               -----------         ------                     CBC with platelets and d...[[id number]]  Abnormal            Final result                 Please view results for these tests on the individual orders.   EXTRA BLUE TOP TUBE   EXTRA RED TOP TUBE   EXTRA GREEN TOP (LITHIUM HEPARIN) ON ICE   EXTRA BLOOD BANK PURPLE TOP TUBE   COVID-19 VIRUS (CORONAVIRUS) BY PCR   CARDIAC CONTINUOUS MONITORING   PULSE OXIMETRY NURSING   EXTRA TUBE    Narrative:     The following orders were created for panel order [facility] Draw.  Procedure                               Abnormality         Status                     ---------                               -----------         ------                     Extra Blue Top Tube[[id number]]                              Final result               Extra Red Top Tube[[id number]]                               Final result               Extra Blood Bank Purple ...[[id number]]                                                 Extra Green Top (Lithium...[[id number]]                      Final result                 Please view results for these tests on the individual orders.     XR Chest Port 1 View   Final Result   IMPRESSION: Portable chest. Lungs are clear. Heart is normal in size.   No pneumothorax. No definite pleural effusions.      ELA BERNARD MD            SYSTEM ID:  [id number]      Abdomen US, complete    (Results Pending)   US Lower Extremity Venous Duplex Bilateral    (Results Pending)       Patient's response to treatments and/or interventions: Tolerated well    To be done/followed up on inpatient unit:  Continue plan of care    Does this patient have any cognitive concerns?: none noted    Activity level - Baseline/Home:  Independent  Activity Level - Current:   Independent    Patient's Preferred language: English   Needed?: No    Isolation: none  Infection: Not Applicable    Patient tested for COVID 19  "prior to admission: YES  Bariatric?: No    Vital Signs:   Vitals:    09/11/21 1612 09/11/21 1700 09/11/21 1850 09/11/21 1900   BP: 133/80 129/84 (!) 126/90 130/77   Pulse: (!) 132 (!) 129 (!) 126 (!) 123   Resp: 18      Temp: 97.2  F (36.2  C)      TempSrc: Temporal      SpO2: 100% 96% 100% 97%   Weight: 72.6 kg (160 lb)      Height: 1.676 m (5' 6\")          Cardiac Rhythm:     Was the PSS-3 completed:   Yes  What interventions are required if any?               Family Comments: Sig other at bedside  OBS brochure/video discussed/provided to patient/family: No              Name of person given brochure if not patient: n/a              Relationship to patient: n/a    For the majority of the shift this patient's behavior was Green.   Behavioral interventions performed were Rounding.    ED NURSE PHONE NUMBER: *[phone]         "

## 2021-09-12 NOTE — PROVIDER NOTIFICATION
MD Notification    Notified Person: MD    Notified Person Name: Dr. Nicole      Notification Date/Time: 09/12/21 0035    Notification Interaction: Web based paging    Purpose of Notification: , Osmolality 244    Orders Received: no    Comments:

## 2021-09-12 NOTE — PLAN OF CARE
Summary: hyponatremia      DATE & TIME: 9/12/21; 2690-3188   Cognitive Concerns/ Orientation : A & O x 4, calm and cooperative    BEHAVIOR & AGGRESSION TOOL COLOR: Green   CIWA SCORE: 0,0  ABNL VS/O2: VSS on RA except tachycardic  MOBILITY: SBA with Gb.   PAIN MANAGMENT: denies   DIET: Regular-tolerating, poor appetite  BOWEL/BLADDER: BS active x 4,   ABNL LAB/BG: Na q6: 121, 118,118 next check at 1800. Hgb: 7.6, phos:2.1 replacement started and check in the AM after the last dose.INR:1.68  DRAIN/DEVICES: PIV, IVF  TELEMETRY RHYTHM: ST  SKIN: pale/jaundice, sclera jaundice, +2 edema to bilateral calves and ankles  TESTS/PROCEDURES: ECHO scheduled   D/C DAY/GOALS/PLACE: discharge 3-4 days, pending improvement in Na & work-up completed  OTHER IMPORTANT INFO: abdomen distended & firm; PT/OT/SW following

## 2021-09-12 NOTE — H&P
[facility]    History and Physical - Hospitalist Service       Date of Admission:  9/11/2021    Assessment & Plan   Danyelle Bustillo is a 55 year old female with hx of alcohol abuse and lack of medical care who was admitted on 9/11/2021 for hyponatremia (Na 117) and painless jaundice    Hyponatremia  * Presented for evaluation following fall, which patient attributes to generalized weakness. Jaundice, BLE edema, and ascites noted on exam. Found to have severe hyponatremia with Na 117  * Ddx: pseudohyponatremia due to bilary obstruction (Tbili 6.6) vs plasma cell dyscrasia (protein gap 6.3, anemia) vs hyperlipidemia. Given long-standing history of alcohol abuse with BLE , hypervolemic hyponatremia due to alcoholic cirrhosis is also a consideration. Paraneoplastic syndrome also a consideration; CXR on arrival showed acute masses or infiltrates.   - She has been receiving NS at 125 ml/h since arrival to the ED. Hold additional IV fluids for now; repeat sodium upon arrival to the floor and adjust fluids accordingly  - Follow sodium q6h for now  - Serum osm, urine osm, urine sodium ordered  - TSH, lipid panel, SPEP/UPEP ordered   - Adominal US pending, CT abd/pelvis ordered     Hyperbilirubinemia, painless jaundice  Elevated transaminases  AST 83, ALT 26, Alk-P 207. Tbili 6.6. Question cholestasis (although patient asymptomatic) vs biliary obstruction from tumor vs alcoholic hepatitis (although bilirubin elevated out of proportion to other LFTs, and platelets normal)  - Differentiate bilirubin  - Check INR  - Abdominal US pending, CT abd/pelvis ordered     BLE edema, ascites  Patient reports several months of BLE (L>R) edema as well as abdominal distension. Question alcoholic cirrhosis vs CHF although NTproBNP on arrival normal and she has no other s/sx to suggest acute CHF vs hypoalbuminemia (albumin only 1.8)   - Abdominal US, BLE doppler US pending    Alcohol use disorder  Long-standing  history of heavy alcohol use; was consuming on average 6-8 beers daily, currently consuming 3-4 beers daily. Last drink: 9/10. She has no known history of alcohol withdrawal. She is tachycardic, but has no other signs of withdrawal at this time  - On CIWA protocol with diazepam  - On thiamine, folate, MVI  - On K/Mg/Phos replacement protocols  - Offer CD consult once medically more stable    Anemia  Hgb 8.7 from unknown baseline. No history to suggest active bleed  - B12/folate, iron studies, peripheral smear, SPEP/UPEP ordered    Mechanical fall due to generalized weakness, physical deconditioning  Severe protein calorie malnutrition in context of acute illness  - Work-up as noted above  - PT/OT,Nutrition consultS requested    Diet: regular diet  DVT Prophylaxis: Pneumatic Compression Devices  James Catheter: Not present  Code Status:   FULL CODE  Rule Out COVID-19 Handoff:  Danyelle is a LOW SUSPICION PUI.  Follow these instructions:    If COVID test positive -> continue isolation precautions    If COVID test negative -> discontinue COVID-specific isolation precaution    Disposition: Expected discharge once sodium >130, hyponatremia work-up complete, and PT/OT eval, 3-4+ days    Devorah Ray MD  [facility]  Contact information available via [facility] Paging/Directory      ______________________________________________________________________    Chief Complaint   Fall    History is obtained from the patient, discussion with ED staff, and review of medical records    History of Present Illness   Danyelle Bustillo is a 55 year old female with hx of alcohol abuse and lack of medical care who presents for evaluation following a fall. The patient is a very poor and inconsistent historian. She reports generalized weakness for the past few weeks that culminated in a fall earlier today. She describes that she was bending over to  an object from the ground when she lost her balance and fell.  "She denies presyncope, syncope, or hitting her head. She denies dizziness/lightheadedeness preceding or following her fall. Her other complaint is BLE edema, L>R, and \"bloating\" for the past few months. She denies chest pain, shortness of breath, fatigue, or PND/orthopnea. She denies fevers/chills or recent illnesses. She denies aches/pains. She believes she has been eating and drinking well, but reports poor appetite. Otherwise, the patient admits a longstanding history of heavy alcohol use - she reports consuming on average 6-7 beers daily, but recently cut down her drinking to 3-4 beers daily. Her last drink was yesterday. She denies prior history of alcohol withdrawal. She denies feeling tremulous, diaphoretic, anxious, or restless    In the ED, work-up was significant for sodium 117. Tbili 6.6. AST 83, ALT 26, Alk-P 207. NTproBNP normal. CXR showed no acute infiltrates. Abdominal and BLE doppler ultrasounds have been ordered, and are pending.     Visit/Communication Style   PHONE communication was used to talk with Danyelle due to the COVID pandemic.  I did not personally see this patient.  Danyelle consented to the use of phone communication: yes  Time spent speaking with the patient: >30 minutes             Review of Systems    10 point Review of Systems was reviewed and pertinent positives and negatives are noted in the HPI    Past Medical History    I have reviewed this patient's medical history and updated it with pertinent information if needed.   No past medical history on file.    Past Surgical History   I have reviewed this patient's surgical history and updated it with pertinent information if needed.  No past surgical history on file.    Social History   Remote history of smoking. Alcohol use as noted in HPI. She denies illicit drug use. She lives with her boyfriend.    Family History   Father  of lung cancer  Mother  of what sounds like an occult malignancy    Prior to Admission Medications   None "     Allergies   No Known Allergies    Physical Exam   Vital Signs: Temp: 97.2  F (36.2  C) Temp src: Temporal BP: 130/77 Pulse: (!) 123   Resp: 18 SpO2: 97 % O2 Device: None (Room air)    Weight: 160 lbs 0 oz    *Given the patient's positive COVID-19  status and the need to conserve PPE and minimize non-essential exposure to patients diagnosed or under investigation, a limited exam was performed on this patient.  The patient was visualized through the hospital door window and the attending ED providers physical exam findings were noted as follows:      Constitutional:             Appears well-developed and well-nourished.   HENT:   Head:                           Atraumatic.   Mouth/Throat:              Oropharynx is clear and moist. No oropharyngeal exudate.   Eyes:                           Pupils are equal, round, and reactive to light. Positive scleral icterus   Neck:                           Normal range of motion. Neck supple.                                       No tracheal deviation present. No thyromegaly present.   Cardiovascular:           Normal rate, regular rhythm, no murmur   Pulmonary/Chest:       Breath sounds are clear and equal without wheezes or crackles.  Abdominal:                  Abdomen is moderatlely distended but not tense. No palpable mass or organ enlargement. There is no tenderness.                                        There is no rebound and no guarding.   Musculoskeletal:         1+ pitting edema to right leg. 2+ pitting edema to left leg.   Lymphadenopathy:     No cervical adenopathy.   Neurological:               Alert and oriented to person, place, and time.   Skin:                            Mild jaundice. Skin is warm and dry. No rash noted. No pallor.     Data   Data reviewed today: I reviewed all medications, new labs and imaging results over the last 24 hours. I personally reviewed the chest x-ray image(s) showing no acute infiltrates.    Recent Labs   Lab 09/11/21  0243    WBC 13.0*   HGB 8.7*   *      *   POTASSIUM 3.9   CHLORIDE 86*   CO2 21   BUN 7   CR 0.76   ANIONGAP 10   BRIANA 8.0*   *   ALBUMIN 1.8*   PROTTOTAL 8.1   BILITOTAL 6.6*   ALKPHOS 207*   ALT 26   AST 83*   LIPASE 256   TROPONIN <0.015         Recent Results (from the past 24 hour(s))   XR Chest Port 1 View    Narrative    XR PORTABLE CHEST ONE VIEW   9/11/2021 6:32 PM     HISTORY: Cough, check for pneumonia.    COMPARISON: None.      Impression    IMPRESSION: Portable chest. Lungs are clear. Heart is normal in size.  No pneumothorax. No definite pleural effusions.    ELA BERNARD MD         SYSTEM ID:  [id number]

## 2021-09-12 NOTE — PROVIDER NOTIFICATION
MD Notification    Notified Person: MD    Notified Person Name: Dr. Ball     Notification Date/Time: 9/12/2021 0915    Notification Interaction: paged provider    Purpose of Notification: HR sustaining 120s (max 128). Any intervention, or do you want a PRN available?     Orders Received: give 10 mg oral Valium per CIWA protocol now. Order EKG.     Comments: Valium given, HR now 110s. EKG done, see results.

## 2021-09-12 NOTE — PHARMACY-ADMISSION MEDICATION HISTORY
Pharmacy Medication History  Admission medication history interview status for the 9/11/2021  admission is complete. See EPIC admission navigator for prior to admission medications     Location of Interview: Phone  Medication history sources: Patient    Significant changes made to the medication list:  No changes made to medication list.    In the past week, patient estimated taking medication this percent of the time: N/A    Additional medication history information:   Patient reports not taking any medications.     Medication reconciliation completed by provider prior to medication history? No    Time spent in this activity: 5 minutes    Prior to Admission medications    Not on File       The information provided in this note is only as accurate as the sources available at the time of update(s)

## 2021-09-12 NOTE — PROVIDER NOTIFICATION
MD Notification    Notified Person: MD    Notified Person Name: Dr. Devorah Ray, on call provider     Notification Date/Time: 9/12/2021 1842    Notification Interaction: paged on call provider     Purpose of Notification: Critical lab: Na 119. q6 Na checks, order to notify with each result. Has NS at 75 now.    Orders Received: MD acknowledged notification, no new orders, continue NS @ 75 ml/hr.     Comments:

## 2021-09-12 NOTE — PROGRESS NOTES
RECEIVING UNIT ED HANDOFF REVIEW    ED Nurse Handoff Report was reviewed by: Sadaf Kearns RN on September 11, 2021 at 9:01 PM

## 2021-09-12 NOTE — PLAN OF CARE
Summary: hyponatremia      DATE & TIME: 9/11/2021; 3422-6956   Cognitive Concerns/ Orientation : A & O x 4, calm and cooperative    BEHAVIOR & AGGRESSION TOOL COLOR: Green   CIWA SCORE: 1 (mild anxiety)  ABNL VS/O2: VSS on RA except tachycardic  MOBILITY: SBA with Gb/pt. Not OOB on this shift-fall risk  PAIN MANAGMENT: denies   DIET: Regular-tolerating   BOWEL/BLADDER: BS active x 4, incontinence at times, last BM today   ABNL LAB/BG: Bj=199 (Q6h checks), AST=83; WBC=13.0; hgb=8.7; INR=1.53; Bilirubin=6.5  DRAIN/DEVICES: PIV/SL   TELEMETRY RHYTHM:   SKIN: pale/jaundice, sclera jaundice, +2 edema to bilateral calves and ankles  TESTS/PROCEDURES: ECHO scheduled   D/C DAY/GOALS/PLACE: pending   OTHER IMPORTANT INFO: abdomen distended and bloated; PT/OT/SW following

## 2021-09-12 NOTE — PROGRESS NOTES
Summary: hyponatremia      DATE & TIME: 9/11/2021-9/12/21; 1023-8304   Cognitive Concerns/ Orientation : A & O x 4, calm and cooperative    BEHAVIOR & AGGRESSION TOOL COLOR: Green   CIWA SCORE: 0  ABNL VS/O2: VSS on RA except tachycardic  MOBILITY: SBA with Gb/pt. Not OOB on this shift-fall risk  PAIN MANAGMENT: denies   DIET: Regular-tolerating   BOWEL/BLADDER: BS active x 4, incontinence at times, last BM 9/11/21; A urine sample is still needed  ABNL LAB/BG: Bg=349 @2311; (Q6h checks), Critical Osmaliltiy of 244. Dr. singh and notified. AST=83; WBC=11.3; hgb=7.4; INR=1.53; Bilirubin=6.5   DRAIN/DEVICES: PIV/SL   TELEMETRY RHYTHM: ST  SKIN: pale/jaundice, sclera jaundice, +2 edema to bilateral calves and ankles  TESTS/PROCEDURES: ECHO scheduled   D/C DAY/GOALS/PLACE: pending   OTHER IMPORTANT INFO: abdomen distended and bloated; PT/OT/SW following

## 2021-09-12 NOTE — PROGRESS NOTES
09/12/21 0800   Quick Adds   Type of Visit Initial Occupational Therapy Evaluation   Living Environment   People in home significant other   Current Living Arrangements house   Home Accessibility stairs to enter home;stairs within home   Number of Stairs, Main Entrance 4   Stair Railings, Main Entrance railings safe and in good condition   Number of Stairs, Within Home, Primary greater than 10 stairs   Stair Railings, Within Home, Primary railings safe and in good condition   Transportation Anticipated car, drives self;family or friend will provide   Living Environment Comments Rambler, main floor living except laundry in basement   Self-Care   Usual Activity Tolerance good   Current Activity Tolerance fair   Regular Exercise No   Equipment Currently Used at Home hospital bed;grab bar, tub/shower  (GB in walk-in shower)   Activity/Exercise/Self-Care Comment Pt reports being indep all ADL, IADL including driving, meds/finances, HH tasks and mobility without AD.    Disability/Function   Hearing Difficulty or Deaf no   Wear Glasses or Blind yes   Vision Management contact lenses and reading glasses   Concentrating, Remembering or Making Decisions Difficulty no   Difficulty Communicating no   Difficulty Eating/Swallowing no   Walking or Climbing Stairs Difficulty no   Dressing/Bathing Difficulty no   Toileting issues no   Doing Errands Independently Difficulty (such as shopping) no   Fall history within last six months yes   Number of times patient has fallen within last six months 2  (1)tripped on 2 overlapping rugs 2) retrieving item on floor)   General Information   Onset of Illness/Injury or Date of Surgery 09/11/21   Referring Physician Dr. Devorah Ray   Patient/Family Therapy Goal Statement (OT) return home   Additional Occupational Profile Info/Pertinent History of Current Problem 56yo female with h/o alcohol use disorder admitted following fall with weakness, LE edema and hyponatremia. CXR showed acute  masses or infiltrates.    Existing Precautions/Restrictions fall   Cognitive Status Examination   Orientation Status orientation to person, place and time   Affect/Mental Status (Cognitive) WNL   Follows Commands follows two-step commands;WFL   Visual Perception   Visual Impairment/Limitations WFL;corrective lenses for distance;corrective lenses for reading   Pain Assessment   Patient Currently in Pain No   Range of Motion Comprehensive   General Range of Motion no range of motion deficits identified   Strength Comprehensive (MMT)   Comment, General Manual Muscle Testing (MMT) Assessment B lynn 4/5; B elb distally 5/5   Coordination   Upper Extremity Coordination No deficits were identified   Bed Mobility   Comment (Bed Mobility) Pt reports having hospital bed at home. Performed sup<>sit SBA using bed rail, unable to scoot to HOB unless bed flat and used B rails for support.   Transfers   Transfers sit-stand transfer;toilet transfer   Sit-Stand Transfer   Sit-Stand [place] (Transfers) contact guard   Toilet Transfer   Type (Toilet Transfer) sit-stand;stand-sit   [place] Level (Toilet Transfer) minimum assist (75% patient effort)   Assistive Device (Toilet Transfer)   (used sink on L to assist)   Toilet Transfer Comments Trialled twice without AD and unsuccessful .    Balance   Balance Comments Indep static and dynamic sitting balance, CGA static standing and CGA-Min A amb without AD, pt unsteady and using walls and other objects for balance support.    Activities of Daily Living   BADL Assessment lower body dressing;toileting   Lower Body Dressing Assessment   [place] Level (Lower Body Dressing) set up;supervision   Position (Lower Body Dressing) edge of bed sitting   Comment (Lower Body Dressing) Pt would require A to retrieve clothing as balance currently impaired   Toileting   [place] Level (Toileting) supervision   Assistive Devices (Toileting) grab bar, toilet   Comment (Toileting) CGA-Min A  for standing balance when managing clothing and with toilet transfer   Clinical Impression   Criteria for Skilled Therapeutic Interventions Met (OT) yes   OT Diagnosis decreased ADL/IADL performance   OT Problem List-Impairments impacting ADL problems related to;activity tolerance impaired;balance;strength   Assessment of Occupational Performance 3-5 Performance Deficits   Identified Performance Deficits functional mobility, dressing, toileting, bathing, household mgmt, errands/community tasks   Planned Therapy Interventions (OT) ADL retraining;IADL retraining;cognition;transfer training   Clinical Decision Making Complexity (OT) low complexity   Therapy Frequency (OT) 5x/week   Predicted Duration of Therapy 3 days   Risk & Benefits of therapy have been explained evaluation/treatment results reviewed;care plan/treatment goals reviewed;risks/benefits reviewed;current/potential barriers reviewed;participants voiced agreement with care plan;participants included;spouse/significant other   OT Discharge Planning    OT Discharge Recommendation (DC Rec) Home with assist   OT Rationale for DC Rec Anticipate daily progress w/medical care for patient to return home w/ S.O assist with heavier household tasks. However, if balance does not improve with medical or physical therapy treatment/use of AD may need TCU. Will cont to monitor. progress and update recommendation accordingly.   OT Brief overview of current status  A & O x 4, follows1-2 step directives coonsistently, a bit slow at responses. BUE function WFL. SBA bed mob, CGA sit<>stand from bed, CGA-Min A amb without AD as unsteady on feet, Min A toilet transfer, able to dress self w/set-up and CGA when standing to manage clothing.    Total Evaluation Time (Minutes)   Total Evaluation Time (Minutes) 15

## 2021-09-12 NOTE — PROVIDER NOTIFICATION
MD Notification    Notified Person: MD    Notified Person Name:Dr Ball    Notification Date/Time:9/12/21 1129    Notification Interaction:spoke with MD    Purpose of Notification:Critical Na 118    Orders Received:    Comments:

## 2021-09-13 ENCOUNTER — APPOINTMENT (OUTPATIENT)
Dept: CARDIOLOGY | Facility: CLINIC | Age: 56
End: 2021-09-13
Attending: INTERNAL MEDICINE
Payer: MEDICAID

## 2021-09-13 ENCOUNTER — APPOINTMENT (OUTPATIENT)
Dept: ULTRASOUND IMAGING | Facility: CLINIC | Age: 56
End: 2021-09-13
Attending: STUDENT IN AN ORGANIZED HEALTH CARE EDUCATION/TRAINING PROGRAM
Payer: MEDICAID

## 2021-09-13 LAB
% LINING CELLS, BODY FLUID: 46 %
ABO/RH(D): NORMAL
ALBUMIN FLD-MCNC: 0.2 G/DL
ALBUMIN SERPL-MCNC: 1.2 G/DL (ref 3.4–5)
ALBUMIN SERPL-MCNC: 1.4 G/DL (ref 3.4–5)
ALP SERPL-CCNC: 157 U/L (ref 40–150)
ALT SERPL W P-5'-P-CCNC: 23 U/L (ref 0–50)
ANION GAP SERPL CALCULATED.3IONS-SCNC: 9 MMOL/L (ref 3–14)
ANTIBODY SCREEN: NEGATIVE
APPEARANCE FLD: ABNORMAL
AST SERPL W P-5'-P-CCNC: 69 U/L (ref 0–45)
BASOPHILS # BLD AUTO: 0 10E3/UL (ref 0–0.2)
BASOPHILS NFR BLD AUTO: 0 %
BILIRUB DIRECT SERPL-MCNC: 2.1 MG/DL (ref 0–0.2)
BILIRUB SERPL-MCNC: 3.6 MG/DL (ref 0.2–1.3)
BLD PROD TYP BPU: NORMAL
BLOOD COMPONENT TYPE: NORMAL
BUN SERPL-MCNC: 7 MG/DL (ref 7–30)
CALCIUM SERPL-MCNC: 6.7 MG/DL (ref 8.5–10.1)
CHLORIDE BLD-SCNC: 92 MMOL/L (ref 94–109)
CO2 SERPL-SCNC: 20 MMOL/L (ref 20–32)
CODING SYSTEM: NORMAL
COLOR FLD: YELLOW
CREAT SERPL-MCNC: 0.71 MG/DL (ref 0.52–1.04)
CROSSMATCH: NORMAL
EOSINOPHIL # BLD AUTO: 0 10E3/UL (ref 0–0.7)
EOSINOPHIL NFR BLD AUTO: 0 %
ERYTHROCYTE [DISTWIDTH] IN BLOOD BY AUTOMATED COUNT: 16.1 % (ref 10–15)
FOLATE SERPL-MCNC: 5.1 NG/ML
GFR SERPL CREATININE-BSD FRML MDRD: >90 ML/MIN/1.73M2
GLUCOSE BLD-MCNC: 101 MG/DL (ref 70–99)
GRAM STAIN RESULT: NORMAL
GRAM STAIN RESULT: NORMAL
HCT VFR BLD AUTO: 17.9 % (ref 35–47)
HGB BLD-MCNC: 6.5 G/DL (ref 11.7–15.7)
IMM GRANULOCYTES # BLD: 0.1 10E3/UL
IMM GRANULOCYTES NFR BLD: 1 %
ISSUE DATE AND TIME: NORMAL
LVEF ECHO: NORMAL
LYMPHOCYTES # BLD AUTO: 1.4 10E3/UL (ref 0.8–5.3)
LYMPHOCYTES NFR BLD AUTO: 15 %
LYMPHOCYTES NFR FLD MANUAL: 4 %
MAGNESIUM SERPL-MCNC: 2 MG/DL (ref 1.6–2.3)
MCH RBC QN AUTO: 37.6 PG (ref 26.5–33)
MCHC RBC AUTO-ENTMCNC: 36.3 G/DL (ref 31.5–36.5)
MCV RBC AUTO: 104 FL (ref 78–100)
MONOCYTES # BLD AUTO: 0.9 10E3/UL (ref 0–1.3)
MONOCYTES NFR BLD AUTO: 10 %
MONOS+MACROS NFR FLD MANUAL: 42 %
NEUTROPHILS # BLD AUTO: 6.9 10E3/UL (ref 1.6–8.3)
NEUTROPHILS NFR BLD AUTO: 74 %
NEUTS BAND NFR FLD MANUAL: 8 %
NRBC # BLD AUTO: 0 10E3/UL
NRBC BLD AUTO-RTO: 0 /100
PATH REPORT.COMMENTS IMP SPEC: NORMAL
PATH REPORT.FINAL DX SPEC: NORMAL
PATH REPORT.MICROSCOPIC SPEC OTHER STN: NORMAL
PATH REPORT.MICROSCOPIC SPEC OTHER STN: NORMAL
PLATELET # BLD AUTO: 150 10E3/UL (ref 150–450)
POTASSIUM BLD-SCNC: 3.8 MMOL/L (ref 3.4–5.3)
PROT FLD-MCNC: 1.5 G/DL
PROT PATTERN UR ELPH-IMP: NORMAL
PROT SERPL-MCNC: 5.8 G/DL (ref 6.8–8.8)
RBC # BLD AUTO: 1.73 10E6/UL (ref 3.8–5.2)
SODIUM SERPL-SCNC: 118 MMOL/L (ref 133–144)
SODIUM SERPL-SCNC: 121 MMOL/L (ref 133–144)
SODIUM SERPL-SCNC: 124 MMOL/L (ref 133–144)
SODIUM SERPL-SCNC: 124 MMOL/L (ref 133–144)
SPECIMEN EXPIRATION DATE: NORMAL
UNIT ABO/RH: NORMAL
UNIT NUMBER: NORMAL
UNIT STATUS: NORMAL
UNIT TYPE ISBT: 6200
VIT B12 SERPL-MCNC: 1279 PG/ML (ref 193–986)
WBC # BLD AUTO: 9.3 10E3/UL (ref 4–11)
WBC # FLD AUTO: 137 /UL

## 2021-09-13 PROCEDURE — 85060 BLOOD SMEAR INTERPRETATION: CPT | Performed by: PATHOLOGY

## 2021-09-13 PROCEDURE — 250N000013 HC RX MED GY IP 250 OP 250 PS 637: Performed by: STUDENT IN AN ORGANIZED HEALTH CARE EDUCATION/TRAINING PROGRAM

## 2021-09-13 PROCEDURE — 93306 TTE W/DOPPLER COMPLETE: CPT

## 2021-09-13 PROCEDURE — 82607 VITAMIN B-12: CPT | Performed by: STUDENT IN AN ORGANIZED HEALTH CARE EDUCATION/TRAINING PROGRAM

## 2021-09-13 PROCEDURE — P9016 RBC LEUKOCYTES REDUCED: HCPCS | Performed by: INTERNAL MEDICINE

## 2021-09-13 PROCEDURE — 250N000013 HC RX MED GY IP 250 OP 250 PS 637: Performed by: INTERNAL MEDICINE

## 2021-09-13 PROCEDURE — 999N000154 HC STATISTIC RADIOLOGY XRAY, US, CT, MAR, NM

## 2021-09-13 PROCEDURE — 0W9G3ZZ DRAINAGE OF PERITONEAL CAVITY, PERCUTANEOUS APPROACH: ICD-10-PCS | Performed by: RADIOLOGY

## 2021-09-13 PROCEDURE — 99233 SBSQ HOSP IP/OBS HIGH 50: CPT | Performed by: STUDENT IN AN ORGANIZED HEALTH CARE EDUCATION/TRAINING PROGRAM

## 2021-09-13 PROCEDURE — 86923 COMPATIBILITY TEST ELECTRIC: CPT | Performed by: INTERNAL MEDICINE

## 2021-09-13 PROCEDURE — 84166 PROTEIN E-PHORESIS/URINE/CSF: CPT | Mod: 26 | Performed by: PATHOLOGY

## 2021-09-13 PROCEDURE — P9047 ALBUMIN (HUMAN), 25%, 50ML: HCPCS | Performed by: STUDENT IN AN ORGANIZED HEALTH CARE EDUCATION/TRAINING PROGRAM

## 2021-09-13 PROCEDURE — 36415 COLL VENOUS BLD VENIPUNCTURE: CPT | Performed by: INTERNAL MEDICINE

## 2021-09-13 PROCEDURE — 82040 ASSAY OF SERUM ALBUMIN: CPT | Performed by: STUDENT IN AN ORGANIZED HEALTH CARE EDUCATION/TRAINING PROGRAM

## 2021-09-13 PROCEDURE — 89050 BODY FLUID CELL COUNT: CPT | Performed by: STUDENT IN AN ORGANIZED HEALTH CARE EDUCATION/TRAINING PROGRAM

## 2021-09-13 PROCEDURE — 82042 OTHER SOURCE ALBUMIN QUAN EA: CPT | Performed by: STUDENT IN AN ORGANIZED HEALTH CARE EDUCATION/TRAINING PROGRAM

## 2021-09-13 PROCEDURE — 87205 SMEAR GRAM STAIN: CPT | Performed by: STUDENT IN AN ORGANIZED HEALTH CARE EDUCATION/TRAINING PROGRAM

## 2021-09-13 PROCEDURE — 82746 ASSAY OF FOLIC ACID SERUM: CPT | Performed by: STUDENT IN AN ORGANIZED HEALTH CARE EDUCATION/TRAINING PROGRAM

## 2021-09-13 PROCEDURE — 250N000011 HC RX IP 250 OP 636: Performed by: STUDENT IN AN ORGANIZED HEALTH CARE EDUCATION/TRAINING PROGRAM

## 2021-09-13 PROCEDURE — 36415 COLL VENOUS BLD VENIPUNCTURE: CPT | Performed by: STUDENT IN AN ORGANIZED HEALTH CARE EDUCATION/TRAINING PROGRAM

## 2021-09-13 PROCEDURE — 86901 BLOOD TYPING SEROLOGIC RH(D): CPT | Performed by: INTERNAL MEDICINE

## 2021-09-13 PROCEDURE — 85025 COMPLETE CBC W/AUTO DIFF WBC: CPT | Performed by: STUDENT IN AN ORGANIZED HEALTH CARE EDUCATION/TRAINING PROGRAM

## 2021-09-13 PROCEDURE — 84157 ASSAY OF PROTEIN OTHER: CPT | Performed by: STUDENT IN AN ORGANIZED HEALTH CARE EDUCATION/TRAINING PROGRAM

## 2021-09-13 PROCEDURE — 93306 TTE W/DOPPLER COMPLETE: CPT | Mod: 26 | Performed by: INTERNAL MEDICINE

## 2021-09-13 PROCEDURE — 87070 CULTURE OTHR SPECIMN AEROBIC: CPT | Performed by: INTERNAL MEDICINE

## 2021-09-13 PROCEDURE — 88305 TISSUE EXAM BY PATHOLOGIST: CPT | Mod: TC | Performed by: STUDENT IN AN ORGANIZED HEALTH CARE EDUCATION/TRAINING PROGRAM

## 2021-09-13 PROCEDURE — 258N000003 HC RX IP 258 OP 636: Performed by: STUDENT IN AN ORGANIZED HEALTH CARE EDUCATION/TRAINING PROGRAM

## 2021-09-13 PROCEDURE — 80048 BASIC METABOLIC PNL TOTAL CA: CPT | Performed by: STUDENT IN AN ORGANIZED HEALTH CARE EDUCATION/TRAINING PROGRAM

## 2021-09-13 PROCEDURE — 120N000001 HC R&B MED SURG/OB

## 2021-09-13 PROCEDURE — 272N000706 US PARACENTESIS

## 2021-09-13 PROCEDURE — 84295 ASSAY OF SERUM SODIUM: CPT | Performed by: STUDENT IN AN ORGANIZED HEALTH CARE EDUCATION/TRAINING PROGRAM

## 2021-09-13 PROCEDURE — 89051 BODY FLUID CELL COUNT: CPT | Performed by: STUDENT IN AN ORGANIZED HEALTH CARE EDUCATION/TRAINING PROGRAM

## 2021-09-13 PROCEDURE — 83735 ASSAY OF MAGNESIUM: CPT | Performed by: STUDENT IN AN ORGANIZED HEALTH CARE EDUCATION/TRAINING PROGRAM

## 2021-09-13 RX ORDER — ALBUMIN (HUMAN) 12.5 G/50ML
12.5 SOLUTION INTRAVENOUS ONCE
Status: CANCELLED | OUTPATIENT
Start: 2021-09-13 | End: 2021-09-13

## 2021-09-13 RX ORDER — LIDOCAINE 40 MG/G
CREAM TOPICAL
Status: CANCELLED | OUTPATIENT
Start: 2021-09-13

## 2021-09-13 RX ORDER — ALBUMIN (HUMAN) 12.5 G/50ML
25 SOLUTION INTRAVENOUS ONCE
Status: COMPLETED | OUTPATIENT
Start: 2021-09-13 | End: 2021-09-13

## 2021-09-13 RX ORDER — LIDOCAINE HYDROCHLORIDE 10 MG/ML
10 INJECTION, SOLUTION EPIDURAL; INFILTRATION; INTRACAUDAL; PERINEURAL ONCE
Status: COMPLETED | OUTPATIENT
Start: 2021-09-13 | End: 2021-09-13

## 2021-09-13 RX ADMIN — LIDOCAINE HYDROCHLORIDE 10 ML: 10 INJECTION, SOLUTION EPIDURAL; INFILTRATION; INTRACAUDAL; PERINEURAL at 14:45

## 2021-09-13 RX ADMIN — THIAMINE HCL TAB 100 MG 100 MG: 100 TAB at 08:43

## 2021-09-13 RX ADMIN — POTASSIUM & SODIUM PHOSPHATES POWDER PACK 280-160-250 MG 1 PACKET: 280-160-250 PACK at 08:44

## 2021-09-13 RX ADMIN — SODIUM CHLORIDE: 9 INJECTION, SOLUTION INTRAVENOUS at 20:05

## 2021-09-13 RX ADMIN — ALBUMIN HUMAN 25 G: 0.25 SOLUTION INTRAVENOUS at 18:18

## 2021-09-13 RX ADMIN — MULTIPLE VITAMINS W/ MINERALS TAB 1 TABLET: TAB at 08:43

## 2021-09-13 RX ADMIN — FOLIC ACID 1 MG: 1 TABLET ORAL at 08:43

## 2021-09-13 RX ADMIN — SODIUM CHLORIDE: 9 INJECTION, SOLUTION INTRAVENOUS at 02:40

## 2021-09-13 ASSESSMENT — ACTIVITIES OF DAILY LIVING (ADL)
ADLS_ACUITY_SCORE: 21
ADLS_ACUITY_SCORE: 23
ADLS_ACUITY_SCORE: 21
ADLS_ACUITY_SCORE: 23

## 2021-09-13 NOTE — PROGRESS NOTES
BOWEL/BLADDER: Up to bathroom, incontinent at times.    ABNL LAB/BG: Na q6: 119 at 1800, on call provider notified of critical result (also has order to notify provider with each result), no new orders, continue IVF at current rate; next check at midnight. Hgb 7.6. Phos 2.1, replacement initiated, recheck am after last dose (ordered for 9/14 am). INR 1.68.   DRAIN/DEVICES: PIV, IVF infusing (NS @ 75 ml/hr)  TELEMETRY RHYTHM: ST  SKIN: pale, jaundice; sclera yellow.   TESTS/PROCEDURES: Echo ordered. EKG ordered when MD notified of tachycardia, Sinus Tach.   D/C DAY/GOALS/PLACE: Pending, 3-4 days pending improvement of Na and hyponatremia work-up. Therapies recommending home with assist, home PT.   OTHER IMPORTANT INFO: Abdomen distended, rounded. +1-2 BLE edema, L>R.  PT/OT.

## 2021-09-13 NOTE — PROGRESS NOTES
"Hospitalist House NP note    I was asked to see patient for post fall evaluation.  This occurred at 218p.m. on 9/13/2021.  Has not called until approximately 520 because of patient did not want to not do any x-rays.    Reviewed the RN documentation patient was using the bathroom she did have the gait belt and walker and antislip socks on.  Patient was standing from the toilet and indicated to staff that it was hard to get up with help and she did later tell me that it is just easier if she gets up and she does \"better on her own\".  On attempting to stand up patient fell to the floor.  This was a witnessed event.  Patient describes having landed on her knee.  She described any head strike, syncope, or loss of consciousness, palpitations, chest pain, shortness of breath.  She describes her legs as being weak and swollen.  She denies any injury but reports she did land on her right knee.  The bathroom is very small and there are 2 staff members attempting to help her.  She thinks that a grab bar on both sides but it would have been more helpful.  She has been working with physical therapy    Constitutional: vs as per EMR.  I note pt was tachycardic around the time of the even.    General:  adult pt lying in bed without acute distress  Neuro: +follows commands wiggle toes and show 2 fingers bilat, face symmetric, tongue midline, speech fluent  Eyes defer  Head, ENT & mouth: NC/AT,  mouth moist oral mucosa  Neck: supple  CV defer  resp: defer  gi:defer  Ext: +1 bilat LE edema, very small, 1 cm horizontal scratch on the right kneecap, patient and boyfriend indicate this predated today's fall  Skin: no rashes on exposed skin  Lymph: defer  Musculoskeletal no bony joint deformities, full range of motion of all large joint articulations, no pain on palpation of any joints.    Impression: witnessed fall primarily related to weakness and very small bathroom without any readily identifiable injury in a pt w/ prior falls 2/2 " generalized weakness and deconditioning in the setting of alcohol use disorder, hyponatremia, new diagnosis of decompensated cirrhosis and malnutrition.  She is not on any anticoagulants and her plt are WNL but she does have cirrhosis.     Interventions  -vs reviewed still tachycardic but this has been documented in EMR for multiple hours  -check glucose  -no indication for imaging at present.  I counseled pt to inform her care team if she develops any new MSK c/o in the hours to days post fall.     No charge note, total time 10 mintues from 530-540pm    Octavia Canales CNP  Hospitalist - House [facility]  [phone]     Text Page

## 2021-09-13 NOTE — PROGRESS NOTES
Care Suites Procedure Nursing Note    Patient Information  Name: Danyelle Bustillo  Age: 55 year old    Procedure: Paracentesis: patient arrived to department via cart. Ultrasound tech performed preliminary scan to find pockets of fluid. MD arrived to explain procedure, obtained consent. Time out was then done. Procedure begun, no issues, drainage in process. Patient was monitored with BP and O2 sats. Patient tolerated well. VSS. 4025 cc medium yellow colored fluid removed from abdomen w/o difficulty. Bandaid applied to site.     1505- Pt back to inpatient room per cart.   Procedure start time: 1430  Procedure complete time: 1500  Concerns/abnormal assessment: No  If abnormal assessment, provider notified: N/A  Plan/Other: return to inpatient room    Teresa Siddiqui RN

## 2021-09-13 NOTE — PROVIDER NOTIFICATION
MD Notification    Notified Person: MD    Notified Person Name:Corey     Notification Date/Time: 09/13/21   0629    Notification Interaction: text page     Purpose of Notification: new result of hemoglobin of 6.5, and sodium 121    Orders Received: put an order for RBC    Comments:

## 2021-09-13 NOTE — PLAN OF CARE
Summary: hyponatremia, Ascites,      DATE & TIME: 9/13/21 3032-7809  Cognitive Concerns/ Orientation : A&O x 4, calm and cooperative    BEHAVIOR & AGGRESSION TOOL COLOR: Green   CIWA SCORE: 1, 0   ABNL VS/O2: Tachy with -121, other VSS on RA  MOBILITY: SBA with a GB and walker  PAIN MANAGMENT: denies pain.   DIET: Regular diet  BOWEL/BLADDER: Up to bathroom, incontinent at times.    ABNL LAB/BG: Na q6: 124 (121), Phos 2.4 on 9/12, received last dose of replacement today, recheck am. Hgb 6.5 (7.6) s/p 1U PRBC, tolerated, no transfusion reactions noted, no signs of active bleeding.    DRAIN/DEVICES: PIV, IVF infusing (NS @ 75 ml/hr)  TELEMETRY RHYTHM: ST  SKIN: pale, jaundice; sclera yellow.   TESTS/PROCEDURES: currently off the floor for US guided paracentesis.   D/C DAY/GOALS/PLACE: Pending, 3-4 days pending improvement of Na and hyponatremia work-up. Therapies recommending home with assist, home PT.   OTHER IMPORTANT INFO: Abdomen distended, rounded. +1-2 BLE edema, L>R.  PT/OT following.

## 2021-09-13 NOTE — PROVIDER NOTIFICATION
MD Notification    Notified Person: MD    Notified Person Name: Corey     Notification Date/Time: 09/13/21 0116    Notification Interaction: text page     Purpose of Notification: new sodium result 118    Orders Received: awaiting     Comments:

## 2021-09-13 NOTE — PLAN OF CARE
Summary: hyponatremia      DATE & TIME: 9/12/21 0961-6867  Cognitive Concerns/ Orientation : A&O x 4, calm and cooperative    BEHAVIOR & AGGRESSION TOOL COLOR: Green   CIWA SCORE: 0   ABNL VS/O2: VSS on RA, except tachycardia, ()  MOBILITY: SBA with GB and walker  PAIN MANAGMENT: denies pain.   DIET: Regular diet  BOWEL/BLADDER: Up to bathroom, incontinent at times.    ABNL LAB/BG: Na q6: 118  on call provider notified of critical result (also has order to notify provider with each result), no new orders, continue IVF at rate of 75ml/hr, next check will be at 0600, Hgb 7.6. Phos 2.1, recheck am after last dose (ordered for 9/14 am). INR 1.68.   DRAIN/DEVICES: PIV, IVF infusing (NS @ 75 ml/hr)  TELEMETRY RHYTHM: ST  SKIN: pale, jaundice; sclera yellow.   TESTS/PROCEDURES:   D/C DAY/GOALS/PLACE: Pending, 3-4 days pending improvement of Na and hyponatremia work-up. Therapies recommending home with assist, home PT.   OTHER IMPORTANT INFO: Abdomen distended, rounded. +1-2 BLE edema, L>R.  PT/OT.

## 2021-09-13 NOTE — PROGRESS NOTES
Hospitalist service cross-cover note:     Paged regarding hemoglobin of 6.5 g/dl. Has been trending down past few days, but no signs of active bleeding per RN. Ordered type and screen and transfusion of 1 unit pRBC.     Adelso Nicole MD   Hospitalist  [phone]

## 2021-09-13 NOTE — PLAN OF CARE
Cognitive Concerns/ Orientation : A&O x 4, calm and cooperative    BEHAVIOR & AGGRESSION TOOL COLOR: Green   CIWA SCORE: 1  ABNL VS/O2: VSS on RA, except tachycardia, (-120s), provider notified of sustained HR in 120s, 1 dose of 10 mg Valium for CIWA order given per MD, HR improved slightly back to 110s.  MOBILITY: SBA with GB.   PAIN MANAGMENT: denies pain.   DIET: Regular diet, no appetite for breakfast but ate well for lunch.   BOWEL/BLADDER: Up to bathroom, incontinent at times.    ABNL LAB/BG: Na q6: 119 at 1800, on call provider notified of critical result (also has order to notify provider with each result), no new orders, continue IVF at current rate; next check at midnight. Hgb 7.6. Phos 2.1, replacement initiated, recheck am after last dose (ordered for 9/14 am). INR 1.68.   DRAIN/DEVICES: PIV, IVF infusing (NS @ 75 ml/hr)  TELEMETRY RHYTHM: ST  SKIN: pale, jaundice; sclera yellow.   TESTS/PROCEDURES: Echo ordered. EKG ordered when MD notified of tachycardia, Sinus Tach.   D/C DAY/GOALS/PLACE: Pending, 3-4 days pending improvement of Na and hyponatremia work-up. Therapies recommending home with assist, home PT.   OTHER IMPORTANT INFO: Abdomen distended, rounded. +1-2 BLE edema, L>R.  PT/OT.

## 2021-09-13 NOTE — PROGRESS NOTES
[facility]    Progress Note- Hospitalist Service       Date of Admission:  9/11/2021    Assessment & Plan   Danyelle Bustillo is a 55 year old female with hx of alcohol abuse and lack of medical care who was admitted on 9/11/2021 for hyponatremia (Na 117) and painless jaundice    Hyponatremia,   * Presented for evaluation following fall, which patient attributes to generalized weakness. In ED noted to have decompensated liver failure with jaundice, BLE edema, and ascites noted on exam. Found to have severe hyponatremia with Na 117. She was given IV fluids initially and then further fluids held.  - Urine osmols low at 220 however Na<5 suggesting patient has intake of very low solute load to absorb any fluids  - Suspect that patient has two etiologies for her hyponatremia including liver failure and beer potomania.     - Na slowing improving on low rate IVF, will continue through today  - Would recommended 09/14 6am Na check to be <129 which would be 8 point in 24 hours. If Na is elevating too quickly likely will need adjustment of IVF rate  - for her liver failure would like to avoid IVF however will continue until ~130s    Elevated transaminases  New diagnosis of decompensated liver cirrhosis  On admission AST 83, ALT 26, Alk-P 207. Tbili 6.6. L  - Abdominal US with cirrhotic appearance to liver and cholelithiasis, no CBD dilation  - suspect cirrhosis is alcohol induced however will obtain hep panel today and diagnostic paracentesis  - no reported melena per patient  however may need to get GI involved to rule out varices, hold off today and continue to monitor    Alcohol use disorder  Long-standing history of heavy alcohol use; was consuming on average 6-8 beers daily, currently consuming 3-4 beers daily. Last drink: 9/10. She has no known history of alcohol withdrawal. She remains tachycardic.  - On CIWA protocol with diazepam  - On thiamine, folate, MVI  - On K/Mg/Phos replacement  protocols  - patient decline CD consult today and told me she can stop on her own    Anemia, unclear acute vs chronic  Hgb 8.7 from unknown baseline.   - trended down at 6.5 this AM no bleeding or melena reported  - B12 high and folate low, iron normal  - SPEP/UPEP ordered and remain pending  - continue folate supplementation, transfuse one unit and follow clinically  - may be related to fluid shifts    Sinus tachycardia  - HR range up to 130s yesterday, small improvement with oral valium  - echo normal  - follow today with IVF and blood    Mechanical fall due to generalized weakness, physical deconditioning  Severe protein calorie malnutrition in context of acute illness  - Work-up as noted above  - PT/OT> recommending home with assist  - Nutrition consult requested    Diet: regular diet  DVT Prophylaxis: Pneumatic Compression Devices  James Catheter: Not present  Code Status: Full Code FULL CODE      Disposition: Expected discharge once sodium >130, hyponatremia work-up complete, and PT/OT eval, 3-4+ days    [facility] [employer]  Contact information available via [facility] Paging/Directory      ______________________________________________________________________    Interval Hx  Patient reports she is feeling better. She denies nausea. Her main complaint to be was bloating in her abdomen. No major pain anywhere. Breathing is stable. No N/v/d. She was informed of cirrhosis and need to stop alcohol consumption. She was not anxious and no tremor noted.    Review of Systems    10 point Review of Systems was reviewed and pertinent positives and negatives are noted in the HPI      Prior to Admission Medications   None     Allergies   No Known Allergies    Physical Exam   Vital Signs: Temp: 98.9  F (37.2  C) Temp src: Oral BP: 102/65 Pulse: 116   Resp: 20 SpO2: 98 % O2 Device: None (Room air)    Weight: 155 lbs 10.32 oz   Constitutional: Awake, alert, cooperative, no apparent  distress  Respiratory: Clear to auscultation bilaterally, no crackles or wheezing  HEENT: noted scleral icterus, EOMI  Cardiovascular: tachycardic rate and normal rhythm, normal S1 and S2, and no murmur noted  GI: Normal bowel sounds, soft,distended, not tender no obvious fluid wave  Skin/Integumen: No rashes, no cyanosis, +2-3 pitting edema in LE  No tremor noted       Data   Data reviewed today: I reviewed all medications, new labs and imaging results over the last 24 hours. I personally reviewed the chest x-ray image(s) showing no acute infiltrates.    Recent Labs   Lab 21  0551 21  2351 21  1802 21  0626 21  2345 21  1725   WBC 9.3  --   --  10.2 11.3* 13.0*   HGB 6.5*  --   --  7.6* 7.4* 8.7*   *  --   --  104* 104* 105*     --   --  169 161 200   INR  --   --   --  1.68*  --  1.53*   * 118* 119* 121*  121*  --  117*   POTASSIUM 3.8  --   --  4.1  --  3.9   CHLORIDE 92*  --   --  90*  --  86*   CO2 20  --   --  21  --  21   BUN 7  --   --  7  --  7   CR 0.71  --   --  0.67  --  0.76   ANIONGAP 9  --   --  10  --  10   BRIANA 6.7*  --   --  7.5*  --  8.0*   *  --   --  79  --  101*   ALBUMIN 1.2*  --   --  1.4*  --  1.8*   PROTTOTAL 5.8*  --   --  6.7*  --  8.1   BILITOTAL 3.6*  --   --  5.7*  --  6.5*  6.6*   ALKPHOS 157*  --   --  168*  --  207*   ALT 23  --   --  20  --  26   AST 69*  --   --  69*  --  83*   LIPASE  --   --   --   --   --  256   TROPONIN  --   --   --   --   --  <0.015         Recent Results (from the past 24 hour(s))   Echocardiogram Complete   Result Value    LVEF  65-70%    Narrative    [id number]  [id number]  [id number]  [id number]^MARISSA^RACHEL^[facility]  Echocardiography Laboratory  [address] [place] [id number]     Name: JENISE BURNS  MRN: [id number]  : 1965  Study Date: 2021 09:06 AM  Age: 55 yrs  Gender: Female  Patient Location: [facility]  Reason For Study: CHF  Ordering  Physician: RACHEL ANN  Performed By: Liz Odonnell     BSA: 1.8 m2  Height: 66 in  Weight: 155 lb  HR: 122  BP: 113/73 mmHg  ______________________________________________________________________________  Procedure  Complete Portable Echo Adult.  ______________________________________________________________________________  Interpretation Summary     The left ventricle is normal in size.  Left ventricular systolic function is normal.  The visual ejection fraction is 65-70%.  Normal left ventricular wall motion  The right ventricle is normal in structure, function and size.  Doppler findings do not suggest pulmonary hypertension.  Unable to assess mean RA pressure due to technically difficult study.  The rhythm was sinus tachycardia.  There is no comparison study available.  ______________________________________________________________________________  Left Ventricle  The left ventricle is normal in size. There is normal left ventricular wall  thickness. Left ventricular systolic function is normal. Grade I or early  diastolic dysfunction. The visual ejection fraction is 65-70%. Normal left  ventricular wall motion.     Right Ventricle  The right ventricle is normal in structure, function and size.     Atria  Normal left atrial size. Right atrial size is normal. There is no atrial shunt  seen.     Mitral Valve  The mitral valve is normal in structure and function. There is trace mitral  regurgitation.     Tricuspid Valve  The tricuspid valve is normal in structure and function. There is trace to  mild tricuspid regurgitation. Unable to assess mean RA pressure due to  technically difficult study. Doppler findings do not suggest pulmonary  hypertension. The right ventricular systolic pressure is approximated at 27.6  mmHg plus the right atrial pressure.     Aortic Valve  The aortic valve is normal in structure and function. No aortic regurgitation  is present. No aortic stenosis is present.     Pulmonic  Valve  The pulmonic valve is not well seen, but is grossly normal. There is trace  pulmonic valvular regurgitation.     Vessels  Normal size aorta.     Pericardium  There is no pericardial effusion.     Rhythm  The rhythm was sinus tachycardia.  ______________________________________________________________________________  MMode/2D Measurements & Calculations     IVSd: 1.3 cm  LVIDd: 4.2 cm  LVIDs: 3.3 cm  LVPWd: 1.1 cm  FS: 21.8 %  LV mass(C)d: 181.9 grams  LV mass(C)dI: 101.4 grams/m2  Ao root diam: 3.2 cm  LA dimension: 4.2 cm  asc Aorta Diam: 2.8 cm  LA/Ao: 1.3  LVOT diam: 2.2 cm  LVOT area: 3.7 cm2  LA Volume (BP): 47.2 ml  LA Volume Index (BP): 26.4 ml/m2  RWT: 0.54     Doppler Measurements & Calculations  MV E max severino: 100.0 cm/sec  MV A max severino: 122.0 cm/sec  MV E/A: 0.82  PA V2 max: 157.5 cm/sec  PA max P.9 mmHg  PA acc time: 0.11 sec  TR max severino: 262.8 cm/sec  TR max P.6 mmHg  E/E' av.0  Lateral E/e': 15.3  Medial E/e': 12.7     ______________________________________________________________________________  Report approved by: Manas Urias 2021 10:04 AM

## 2021-09-14 ENCOUNTER — APPOINTMENT (OUTPATIENT)
Dept: PHYSICAL THERAPY | Facility: CLINIC | Age: 56
End: 2021-09-14
Payer: MEDICAID

## 2021-09-14 ENCOUNTER — APPOINTMENT (OUTPATIENT)
Dept: OCCUPATIONAL THERAPY | Facility: CLINIC | Age: 56
End: 2021-09-14
Payer: MEDICAID

## 2021-09-14 LAB
ALBUMIN SERPL ELPH-MCNC: 0 G/DL (ref 3.7–5.1)
ALPHA1 GLOB SERPL ELPH-MCNC: 2.3 G/DL (ref 0.2–0.4)
ALPHA2 GLOB SERPL ELPH-MCNC: 0.4 G/DL (ref 0.5–0.9)
ANION GAP SERPL CALCULATED.3IONS-SCNC: 6 MMOL/L (ref 3–14)
ATRIAL RATE - MUSE: 132 BPM
B-GLOBULIN SERPL ELPH-MCNC: 0.4 G/DL (ref 0.6–1)
BASOPHILS # BLD AUTO: 0 10E3/UL (ref 0–0.2)
BASOPHILS NFR BLD AUTO: 0 %
BLD PROD TYP BPU: NORMAL
BLOOD COMPONENT TYPE: NORMAL
BUN SERPL-MCNC: 4 MG/DL (ref 7–30)
CALCIUM SERPL-MCNC: 7.3 MG/DL (ref 8.5–10.1)
CHLORIDE BLD-SCNC: 99 MMOL/L (ref 94–109)
CO2 SERPL-SCNC: 22 MMOL/L (ref 20–32)
CODING SYSTEM: NORMAL
CREAT SERPL-MCNC: 0.7 MG/DL (ref 0.52–1.04)
CROSSMATCH: NORMAL
DIASTOLIC BLOOD PRESSURE - MUSE: NORMAL MMHG
EOSINOPHIL # BLD AUTO: 0.1 10E3/UL (ref 0–0.7)
EOSINOPHIL NFR BLD AUTO: 1 %
ERYTHROCYTE [DISTWIDTH] IN BLOOD BY AUTOMATED COUNT: 18 % (ref 10–15)
GAMMA GLOB SERPL ELPH-MCNC: 0.9 G/DL (ref 0.7–1.6)
GFR SERPL CREATININE-BSD FRML MDRD: >90 ML/MIN/1.73M2
GLUCOSE BLD-MCNC: 116 MG/DL (ref 70–99)
HCT VFR BLD AUTO: 19.8 % (ref 35–47)
HGB BLD-MCNC: 6.9 G/DL (ref 11.7–15.7)
HGB BLD-MCNC: 8.8 G/DL (ref 11.7–15.7)
IMM GRANULOCYTES # BLD: 0.1 10E3/UL
IMM GRANULOCYTES NFR BLD: 1 %
INTERPRETATION ECG - MUSE: NORMAL
ISSUE DATE AND TIME: NORMAL
LYMPHOCYTES # BLD AUTO: 1.2 10E3/UL (ref 0.8–5.3)
LYMPHOCYTES NFR BLD AUTO: 17 %
M PROTEIN SERPL ELPH-MCNC: 0.1 G/DL
MAGNESIUM SERPL-MCNC: 1.9 MG/DL (ref 1.6–2.3)
MCH RBC QN AUTO: 36.1 PG (ref 26.5–33)
MCHC RBC AUTO-ENTMCNC: 34.8 G/DL (ref 31.5–36.5)
MCV RBC AUTO: 104 FL (ref 78–100)
MONOCYTES # BLD AUTO: 0.6 10E3/UL (ref 0–1.3)
MONOCYTES NFR BLD AUTO: 9 %
NEUTROPHILS # BLD AUTO: 5.1 10E3/UL (ref 1.6–8.3)
NEUTROPHILS NFR BLD AUTO: 72 %
NRBC # BLD AUTO: 0 10E3/UL
NRBC BLD AUTO-RTO: 0 /100
P AXIS - MUSE: 43 DEGREES
PHOSPHATE SERPL-MCNC: 2.1 MG/DL (ref 2.5–4.5)
PLATELET # BLD AUTO: 144 10E3/UL (ref 150–450)
POTASSIUM BLD-SCNC: 3.4 MMOL/L (ref 3.4–5.3)
POTASSIUM BLD-SCNC: 3.8 MMOL/L (ref 3.4–5.3)
PR INTERVAL - MUSE: 128 MS
PROT PATTERN SERPL ELPH-IMP: ABNORMAL
QRS DURATION - MUSE: 72 MS
QT - MUSE: 320 MS
QTC - MUSE: 474 MS
R AXIS - MUSE: 26 DEGREES
RBC # BLD AUTO: 1.91 10E6/UL (ref 3.8–5.2)
SODIUM SERPL-SCNC: 124 MMOL/L (ref 133–144)
SODIUM SERPL-SCNC: 127 MMOL/L (ref 133–144)
SODIUM SERPL-SCNC: 127 MMOL/L (ref 133–144)
SODIUM SERPL-SCNC: 129 MMOL/L (ref 133–144)
SODIUM SERPL-SCNC: 130 MMOL/L (ref 133–144)
SYSTOLIC BLOOD PRESSURE - MUSE: NORMAL MMHG
T AXIS - MUSE: -4 DEGREES
UNIT ABO/RH: NORMAL
UNIT NUMBER: NORMAL
UNIT STATUS: NORMAL
UNIT TYPE ISBT: 6200
VENTRICULAR RATE- MUSE: 132 BPM
WBC # BLD AUTO: 7 10E3/UL (ref 4–11)

## 2021-09-14 PROCEDURE — P9016 RBC LEUKOCYTES REDUCED: HCPCS | Performed by: STUDENT IN AN ORGANIZED HEALTH CARE EDUCATION/TRAINING PROGRAM

## 2021-09-14 PROCEDURE — 84100 ASSAY OF PHOSPHORUS: CPT | Performed by: STUDENT IN AN ORGANIZED HEALTH CARE EDUCATION/TRAINING PROGRAM

## 2021-09-14 PROCEDURE — 97530 THERAPEUTIC ACTIVITIES: CPT | Mod: GP

## 2021-09-14 PROCEDURE — 86923 COMPATIBILITY TEST ELECTRIC: CPT | Performed by: STUDENT IN AN ORGANIZED HEALTH CARE EDUCATION/TRAINING PROGRAM

## 2021-09-14 PROCEDURE — 84295 ASSAY OF SERUM SODIUM: CPT | Performed by: STUDENT IN AN ORGANIZED HEALTH CARE EDUCATION/TRAINING PROGRAM

## 2021-09-14 PROCEDURE — 250N000013 HC RX MED GY IP 250 OP 250 PS 637: Performed by: INTERNAL MEDICINE

## 2021-09-14 PROCEDURE — 250N000013 HC RX MED GY IP 250 OP 250 PS 637: Performed by: PHYSICIAN ASSISTANT

## 2021-09-14 PROCEDURE — 97535 SELF CARE MNGMENT TRAINING: CPT | Mod: GO

## 2021-09-14 PROCEDURE — 87340 HEPATITIS B SURFACE AG IA: CPT | Performed by: STUDENT IN AN ORGANIZED HEALTH CARE EDUCATION/TRAINING PROGRAM

## 2021-09-14 PROCEDURE — 85018 HEMOGLOBIN: CPT | Performed by: STUDENT IN AN ORGANIZED HEALTH CARE EDUCATION/TRAINING PROGRAM

## 2021-09-14 PROCEDURE — 86706 HEP B SURFACE ANTIBODY: CPT | Performed by: PHYSICIAN ASSISTANT

## 2021-09-14 PROCEDURE — 86803 HEPATITIS C AB TEST: CPT | Performed by: STUDENT IN AN ORGANIZED HEALTH CARE EDUCATION/TRAINING PROGRAM

## 2021-09-14 PROCEDURE — 250N000011 HC RX IP 250 OP 636: Performed by: PHYSICIAN ASSISTANT

## 2021-09-14 PROCEDURE — 85025 COMPLETE CBC W/AUTO DIFF WBC: CPT | Performed by: STUDENT IN AN ORGANIZED HEALTH CARE EDUCATION/TRAINING PROGRAM

## 2021-09-14 PROCEDURE — 120N000001 HC R&B MED SURG/OB

## 2021-09-14 PROCEDURE — 99222 1ST HOSP IP/OBS MODERATE 55: CPT | Performed by: SURGERY

## 2021-09-14 PROCEDURE — 97116 GAIT TRAINING THERAPY: CPT | Mod: GP

## 2021-09-14 PROCEDURE — 83735 ASSAY OF MAGNESIUM: CPT | Performed by: STUDENT IN AN ORGANIZED HEALTH CARE EDUCATION/TRAINING PROGRAM

## 2021-09-14 PROCEDURE — 36415 COLL VENOUS BLD VENIPUNCTURE: CPT | Performed by: STUDENT IN AN ORGANIZED HEALTH CARE EDUCATION/TRAINING PROGRAM

## 2021-09-14 PROCEDURE — 258N000003 HC RX IP 258 OP 636: Performed by: STUDENT IN AN ORGANIZED HEALTH CARE EDUCATION/TRAINING PROGRAM

## 2021-09-14 PROCEDURE — P9047 ALBUMIN (HUMAN), 25%, 50ML: HCPCS | Performed by: PHYSICIAN ASSISTANT

## 2021-09-14 PROCEDURE — 99233 SBSQ HOSP IP/OBS HIGH 50: CPT | Performed by: STUDENT IN AN ORGANIZED HEALTH CARE EDUCATION/TRAINING PROGRAM

## 2021-09-14 PROCEDURE — 84165 PROTEIN E-PHORESIS SERUM: CPT | Mod: 26 | Performed by: PATHOLOGY

## 2021-09-14 PROCEDURE — 250N000013 HC RX MED GY IP 250 OP 250 PS 637: Performed by: STUDENT IN AN ORGANIZED HEALTH CARE EDUCATION/TRAINING PROGRAM

## 2021-09-14 PROCEDURE — 84132 ASSAY OF SERUM POTASSIUM: CPT | Performed by: STUDENT IN AN ORGANIZED HEALTH CARE EDUCATION/TRAINING PROGRAM

## 2021-09-14 PROCEDURE — 86704 HEP B CORE ANTIBODY TOTAL: CPT | Performed by: STUDENT IN AN ORGANIZED HEALTH CARE EDUCATION/TRAINING PROGRAM

## 2021-09-14 RX ORDER — POTASSIUM CHLORIDE 1500 MG/1
40 TABLET, EXTENDED RELEASE ORAL ONCE
Status: COMPLETED | OUTPATIENT
Start: 2021-09-14 | End: 2021-09-14

## 2021-09-14 RX ORDER — ALBUMIN (HUMAN) 12.5 G/50ML
25 SOLUTION INTRAVENOUS EVERY 8 HOURS
Status: COMPLETED | OUTPATIENT
Start: 2021-09-14 | End: 2021-09-17

## 2021-09-14 RX ORDER — PANTOPRAZOLE SODIUM 40 MG/1
40 TABLET, DELAYED RELEASE ORAL
Status: DISCONTINUED | OUTPATIENT
Start: 2021-09-14 | End: 2021-09-16

## 2021-09-14 RX ADMIN — POTASSIUM & SODIUM PHOSPHATES POWDER PACK 280-160-250 MG 1 PACKET: 280-160-250 PACK at 16:38

## 2021-09-14 RX ADMIN — POTASSIUM CHLORIDE 40 MEQ: 1500 TABLET, EXTENDED RELEASE ORAL at 10:10

## 2021-09-14 RX ADMIN — ALBUMIN HUMAN 25 G: 0.25 SOLUTION INTRAVENOUS at 13:13

## 2021-09-14 RX ADMIN — ALBUMIN HUMAN 25 G: 0.25 SOLUTION INTRAVENOUS at 20:19

## 2021-09-14 RX ADMIN — POTASSIUM & SODIUM PHOSPHATES POWDER PACK 280-160-250 MG 1 PACKET: 280-160-250 PACK at 22:24

## 2021-09-14 RX ADMIN — POTASSIUM & SODIUM PHOSPHATES POWDER PACK 280-160-250 MG 1 PACKET: 280-160-250 PACK at 10:10

## 2021-09-14 RX ADMIN — MULTIPLE VITAMINS W/ MINERALS TAB 1 TABLET: TAB at 08:30

## 2021-09-14 RX ADMIN — THIAMINE HCL TAB 100 MG 100 MG: 100 TAB at 08:30

## 2021-09-14 RX ADMIN — FOLIC ACID 1 MG: 1 TABLET ORAL at 08:30

## 2021-09-14 RX ADMIN — SODIUM CHLORIDE: 9 INJECTION, SOLUTION INTRAVENOUS at 08:30

## 2021-09-14 RX ADMIN — PANTOPRAZOLE SODIUM 40 MG: 40 TABLET, DELAYED RELEASE ORAL at 16:38

## 2021-09-14 ASSESSMENT — ACTIVITIES OF DAILY LIVING (ADL)
ADLS_ACUITY_SCORE: 23
ADLS_ACUITY_SCORE: 24
ADLS_ACUITY_SCORE: 23

## 2021-09-14 ASSESSMENT — MIFFLIN-ST. JEOR: SCORE: 1339.75

## 2021-09-14 NOTE — PLAN OF CARE
Summary: hyponatremia, Ascites,      DATE & TIME: 9/13/21 3 p to 11 p  Cognitive Concerns/ Orientation : A&O x 4, calm and cooperative    BEHAVIOR & AGGRESSION TOOL COLOR: Green   CIWA SCORE: 0  ABNL VS/O2: Tachy with -121 at rest, can go as high to 140s during short ambulation  MOBILITY: SBA with a GB and walker  PAIN MANAGMENT: denies pain.   DIET: Regular diet, tolerating  BOWEL/BLADDER: Up to bathroom, incontinent at times, incontinent BM x 1 small/liquid  ABNL LAB/BG: Na Q6 124, Phos 2.4 on 9/12, received last dose of replacement today, recheck am. Hgb 6.5 , received 1 unit of blood  DRAIN/DEVICES: PIV, IVF infusing (NS @ 75 ml/hr)  TELEMETRY RHYTHM: ST  SKIN: pale, jaundice; sclera yellow.   TESTS/PROCEDURES: paracentesis done today  D/C DAY/GOALS/PLACE: Pending, 3-4 days pending improvement of Na and hyponatremia work-up. Therapies recommending home with assist, home PT.   OTHER IMPORTANT INFO: Abdomen distended

## 2021-09-14 NOTE — CONSULTS
"CLINICAL NUTRITION SERVICES  -  ASSESSMENT NOTE    Recommendations Ordered by Registered Dietitian (RD):   - Trial carnation and ensure supplements  - continue micronutrients as ordered    Malnutrition:   % Weight Loss: Unable to assess   % Intake:  </= 75% for >/= 1 month (severe malnutrition)  Subcutaneous Fat Loss:  Orbital region mild depletion, Upper arm region moderate depletion and Thoracic region moderate depletion  Muscle Loss:  Temporal region mild depletion, Clavicle bone region moderate depletion, Acromion bone region moderate depletion, Scapular bone region moderate depletion and Dorsal hand region moderate depletion  Fluid Retention:  Moderate ascites     Malnutrition Diagnosis: Severe malnutrition  In Context of:  Chronic illness or disease     REASON FOR ASSESSMENT  Danyelle Bustillo is a 55 year old female seen by Registered Dietitian for Provider Order - \"cirrhosis, malnutrition\"    NUTRITION HISTORY  - Information obtained from chart and patient, significant other at bedside.   - H/o alcohol abuse. Admitted after a fall.   - Drinks 2-4 beers daily per report     - Danyelle has been eating 1-2 meals/day. Skips breakfast - this is habitual.   - Loves seafood, likes eggs, yogurt, steak, chicken.   - Does not use protein supplements but though about starting to make her own.     CURRENT NUTRITION ORDERS  Diet Order:     Regular     Current Intake/Tolerance:  Tolerating % of meals since admission. Had just ordered lunch prior to visit.     NUTRITION FOCUSED PHYSICAL ASSESSMENT FOR DIAGNOSING MALNUTRITION)  Yes              Observed:    Muscle wasting (refer to documentation in Malnutrition section) and Subcutaneous fat loss (refer to documentation in Malnutrition section)    Obtained from Chart/Interdisciplinary Team:  Procedure tomorrow  9/13 - Paracentesis 4L drained     ANTHROPOMETRICS  Height: 5' 6\"  Weight: 159 lbs 6.28 oz (72.3 kg)   Body mass index is 25.73 kg/m .  Weight Status:  Overweight " BMI 25-29.9  IBW: 59.1 kg   % IBW: 122%  Weight History: Pt unable to speak to weight changes   Wt Readings from Last 10 Encounters:   09/14/21 72.3 kg (159 lb 6.3 oz)       LABS  Labs reviewed    Electrolytes  Potassium (mmol/L)   Date Value   09/14/2021 3.4   09/13/2021 3.8   09/12/2021 4.1     Phosphorus (mg/dL)   Date Value   09/14/2021 2.1 (L)   09/12/2021 2.4 (L)   09/11/2021 2.2 (L)    Blood Glucose  Glucose (mg/dL)   Date Value   09/14/2021 116 (H)   09/13/2021 101 (H)   09/12/2021 79   09/11/2021 101 (H)    Inflammatory Markers  WBC Count (10e3/uL)   Date Value   09/14/2021 7.0   09/13/2021 9.3   09/12/2021 10.2     Albumin (g/dL)   Date Value   09/13/2021 1.4 (L)   09/13/2021 1.2 (L)   09/12/2021 1.4 (L)      Magnesium (mg/dL)   Date Value   09/14/2021 1.9   09/13/2021 2.0   09/12/2021 2.1     Sodium (mmol/L)   Date Value   09/14/2021 127 (L)   09/14/2021 127 (L)   09/14/2021 124 (L)    Renal  Urea Nitrogen (mg/dL)   Date Value   09/14/2021 4 (L)   09/13/2021 7   09/12/2021 7     Creatinine (mg/dL)   Date Value   09/14/2021 0.70   09/13/2021 0.71   09/12/2021 0.67     Additional  Triglycerides (mg/dL)   Date Value   09/12/2021 69           MEDICATIONS  Medications reviewed  Folic Acid 1 mg, Thera vit M daily,   NeutraPhos for replacement     ASSESSED NUTRITION NEEDS PER APPROVED PRACTICE GUIDELINES:  Dosing Weight 62.4 kg - adjusted   Estimated Energy Needs: 5282-2615 kcals (25-30 Kcal/Kg)  Justification: maintenance  Estimated Protein Needs: 75-94 grams protein (1.2-1.5 g pro/Kg)  Justification: maintenance   Estimated Fluid Needs: >1 mL/kcal  Justification: maintenance    MALNUTRITION:  % Weight Loss: Unable to assess   % Intake:  </= 75% for >/= 1 month (severe malnutrition)  Subcutaneous Fat Loss:  Orbital region mild depletion, Upper arm region moderate depletion and Thoracic region moderate depletion  Muscle Loss:  Temporal region mild depletion, Clavicle bone region moderate depletion, Acromion bone  region moderate depletion, Scapular bone region moderate depletion and Dorsal hand region moderate depletion  Fluid Retention:  Moderate ascites     Malnutrition Diagnosis: Severe malnutrition  In Context of:  Chronic illness or disease    NUTRITION DIAGNOSIS:  Inadequate oral intake related to reduced appetite, habit as evidenced by intake of only 1-2 meals/day, fat and muscle wasting on exam.       NUTRITION INTERVENTIONS  Recommendations / Nutrition Prescription  - Trial carnation and ensure supplements  - continue micronutrients as ordered       Implementation  Nutrition education: Provided education on protein.   Medical Food Supplement: as above      Nutrition Goals  Intake of >75% meals TID + 1-2 supplements.       MONITORING AND EVALUATION:  Progress towards goals will be monitored and evaluated per protocol and Practice Guidelines    Lelo Goodman RD, LD  [facility], [address], 56, [facility]   Pager: [phone]  Weekend Pager: [phone]

## 2021-09-14 NOTE — PLAN OF CARE
Summary: hyponatremia, Ascites,      DATE & TIME: 9/14/2021 7718-4723  Cognitive Concerns/ Orientation : A&O x 4, calm and cooperative    BEHAVIOR & AGGRESSION TOOL COLOR: Green   CIWA SCORE: 0  ABNL VS/O2: VSS except tachy (117-122) on RA  MOBILITY: SBA with a GB and walker  PAIN MANAGMENT: denies pain.   DIET: Regular diet, tolerating  BOWEL/BLADDER: Up to bathroom, incontinent at times, no BM this shift.  ABNL LAB/BG: Na Q6 124, next at 0600.   DRAIN/DEVICES: PIV, IVF infusing (NS @ 75 ml/hr)  TELEMETRY RHYTHM: ST  SKIN: pale, jaundiced; sclera yellow.   TESTS/PROCEDURES: Paracentesis done yesterday (9/13/21)  D/C DAY/GOALS/PLACE: Discharge pending improvement of Na and hyponatremia work-up. Therapies recommending home with assist, home PT.   OTHER IMPORTANT INFO: Abdomen distended.  Band-aid on abdomen CDI.

## 2021-09-14 NOTE — PLAN OF CARE
MD Notification    Notified Person: MD      Notified Person Name: Quinn    Notification Date/Time: 9/14 0905    Notification Interaction: [employer] Messaging    Purpose of Notification: Hgb 6.9, Na 127    Orders Received: GI consulted    Comments:

## 2021-09-14 NOTE — CONSULTS
General Surgery Consultation    Danyelle Bustillo MRN#: [id number]   Age: 55 year old YOB: 1965     The surgical service was consulted by ROMELIA Dudley to evaluate and/or treat this patient for possible cholecystitis.    Date of Admission:          9/11/2021       Chief Complaint:     Chief Complaint   Patient presents with     Leg Swelling     Generalized Weakness     Bloated     pt looks jaundiced           History of Present Illness:   This patient is a 55 year old female who presented to the [facility] ER after a fall.  She c/o leg swelling (intermittent x 2 months), abdominal distention (x 1 day), and weakness (several weeks).  She denies abdominal pain, fever, chills, nausea, vomiting, change in BM or urination.  She denies having any previous episodes or abdominal surgeries.  The patient's co-morbidities include alcohol use disorder and now cirrhosis.  Since admission, she underwent a paracentesis with approximately 4L removed.  She had a witnessed fall 9/13 in the hospital but declined x-rays per note.  The history is obtained from the patient and chart. The patient is on a regular diet.    COVID result: negative 9/11         Past Medical History:   Alcohol use disorder         Past Surgical History:   None         Medications:   None         Current Medications:           albumin human  25 g Intravenous Q8H     folic acid  1 mg Oral Daily     multivitamin w/minerals  1 tablet Oral Daily     pantoprazole  40 mg Oral BID AC     potassium & sodium phosphates  1 packet Oral TID     sodium chloride (PF)  3 mL Intracatheter Q8H     thiamine  100 mg Oral Daily     acetaminophen **OR** acetaminophen, bisacodyl, diazepam **OR** diazepam, flumazenil, lidocaine 4%, lidocaine (buffered or not buffered), melatonin, ondansetron **OR** ondansetron, polyethylene glycol, prochlorperazine **OR** prochlorperazine **OR** prochlorperazine, senna-docusate **OR** senna-docusate, sodium chloride  "(PF)         Allergies:   No Known Allergies          Social History:     Social History     Tobacco Use     Smoking status: Former Smoker     Smokeless tobacco: Never Used     Tobacco comment: quit in her 20's   Substance Use Topics     Alcohol use: Yes     Alcohol/week: 5.0 standard drinks     Types: 5 Cans of beer per week     Comment: Daily drinker, has been \"cutting back\"          Family History:   Lung cancer- father, cancer ? Source- mother         Review of Systems:   The 12 point Review of Systems is negative other than noted in the HPI.         Physical Exam:   Blood pressure 102/61, pulse 114, temperature 98.6  F (37  C), temperature source Oral, resp. rate 16, height 1.676 m (5' 6\"), weight 72.3 kg (159 lb 6.3 oz), SpO2 96 %.  I/O last 3 completed shifts:  In: 2899 [P.O.:640; I.V.:2259]  Out: -   General - This is a female in no apparent distress.  HEENT - Normocephalic. Atraumatic. Moist mucous membranes. Pupils equal.   Neck - Supple without masses or lymphadenopathy.  Lungs - No increased work in breathing.  Heart - Tachycardia and Regular rhythm.  Abdomen - Soft, nt, distended with ascites.  Extremities - Moves all extremities. Mild lower extremity edema.  Neurologic - Nonfocal.  Skin - Warm and dry.          Data:   Labs:  Recent Labs   Lab Test 09/14/21  0750 09/13/21  0551 09/12/21  0626   WBC 7.0 9.3 10.2   HGB 6.9* 6.5* 7.6*   HCT 19.8* 17.9* 20.6*   * 150 169     Recent Labs   Lab Test 09/14/21  0750 09/13/21  0551 09/12/21  0626   POTASSIUM 3.4 3.8 4.1   CHLORIDE 99 92* 90*   CO2 22 20 21   BUN 4* 7 7   CR 0.70 0.71 0.67     Recent Labs   Lab Test 09/13/21  0551 09/12/21  0626 09/11/21  1725   BILITOTAL 3.6* 5.7* 6.5*  6.6*   DBIL 2.1*  --  3.8*   ALT 23 20 26   AST 69* 69* 83*   ALKPHOS 157* 168* 207*   LIPASE  --   --  256     Recent Labs   Lab Test 09/12/21  0626 09/11/21  1725   INR 1.68* 1.53*     Ultrasound of the abdomen:.  Fatty infiltration liver with nodular liver contour " concerning for  cirrhosis. Moderate ascites.  Cholelithiasis with gallbladder wall thickening, nonspecific in  the setting of ascites. Follow-up hepatobiliary scan could be performed to assess for cystic and common bile duct patency.         Assessment:   Cholelithiasis, nonspecific gallbladder wall thickening  Ascites secondary to cirrhosis  Hyperbilirubinemia  Hyponatremia  Anemia         Plan:   - Denies abdominal pain and tolerating food, doubt acute cholecystitis or surgical intervention in setting of ascites  - Consider HIDA only if RUQ pain/symptoms occur  - Protonix  - Medical management per hospitalist, appreciate your help    Thank you very much for this consult.     Time spent: 45 minutes total time spent on the date of this encounter doing: chart review, review of test results, patient visit/obtaining a history, physical exam, education, counseling, developing plan of care, and documenting.     I have discussed the history, physical, and plan with Dr. Lazo, who has independently interviewed and examined the patient and agrees with the plan as stated.     Madelin Skinner PA-C  [facility]  [phone]

## 2021-09-14 NOTE — CONSULTS
[facility]  Gastroenterology Consultation         Danyelle Bustillo  [address]  [facility] [address]  55 year old female    Admission Date/Time: 9/11/2021  Primary Care Provider: No Ref-Primary, Physician  Referring / Attending Physician:  Dr. Cyndi Ball    We were asked to see the patient in consultation by Dr. Cyndi aBll for evaluation of persistent anemia.      CC: fall    HPI:  Danyelle Bustillo is a 55 year old female who has history of alcohol abuse, with no medical care for last 15 or more years, whom presented to [facility] with complaints of a fall. She reports weakness over last few weeks. She bent over day of admission (9/11/21) when trying to bend and  an object. She had no syncope, dizziness, lightheadedness preceding fall. She reports BLE edema, bloating. She has had no chest pain, abdominal pain, fatigue, shortness of breath, fever, chills, recent illness, melena, hematochezia, diarrhea or constipation. She has had a poor appetite but has tried to keep up with her nutrition. She drinks 2-4 beers daily. Reports drank much more in 20s and 30s around 1 L of whiskey daily. She denies NSAID use.    She has underwent a ultrasound guided paracentesis on 9/13 and removed 4 L of clear fluid and symptoms of distention improved.    VSS. Afebrile.Sodium 117 improved to 127, Tbili 6.6 improved to 3.6. AST 83->69, ALT 26->23, Alk-P 207->157. NTproBNP normal. CXR showed no acute infiltrates. Albumin 1.4. Hemoglobin 8.7->7.6->6.9. Platelets 144. INR 1.68, Ethanol <0.01. Ammonia 34.    Abdominal ultrasound noted fatty liver with nodular contour concerning for cirrhosis. Moderate ascites. Cholelithiasis with gallbladder wall thickening-nonspecific in the setting of ascites. Follow-up hepatobiliary scan could be performed to assess for cystic and common bile duct patency.    ROS: A comprehensive ten point review of systems was negative aside from those in mentioned in the HPI.   "    PAST MED HX:  I have reviewed this patient's medical history and updated it with pertinent information if needed.   History reviewed. No pertinent past medical history.    MEDICATIONS: None       ALLERGIES: No Known Allergies    SOCIAL HISTORY:  Social History     Tobacco Use     Smoking status: Former Smoker     Smokeless tobacco: Never Used     Tobacco comment: quit in her 20's   Substance Use Topics     Alcohol use: Yes     Alcohol/week: 5.0 standard drinks     Types: 5 Cans of beer per week     Comment: Daily drinker, has been \"cutting back\"     Drug use: None     Comment: \"never tried any street drugs\"       FAMILY HISTORY:  History reviewed. No pertinent family history.    PHYSICAL EXAM:   General  Alert, oriented and comfortable  Vital Signs with Ranges  Temp: 98.6  F (37  C) Temp src: Oral BP: 102/61 Pulse: 114   Resp: 16 SpO2: 96 % O2 Device: None (Room air)    I/O last 3 completed shifts:  In: 2899 [P.O.:640; I.V.:2259]  Out: -     Constitutional: alert, no distress, cooperative and jaundice   Cardiovascular: negative, PMI normal. No lifts, heaves, or thrills. RRR. No murmurs, clicks gallops or rub  Respiratory: negative, Percussion normal. Good diaphragmatic excursion. Lungs clear  Abdomen: Abdomen soft, distended, non-tender. BS normal. No masses, organomegaly      ADDITIONAL COMMENTS:   I reviewed the patient's new clinical lab test results.   Recent Labs   Lab Test 09/14/21  0750 09/13/21  0551 09/12/21  0626 09/11/21  1725   WBC 7.0 9.3 10.2 13.0*   HGB 6.9* 6.5* 7.6* 8.7*   * 104* 104* 105*   * 150 169 200   INR  --   --  1.68* 1.53*     Recent Labs   Lab Test 09/14/21  0750 09/13/21  0551 09/12/21  0626   POTASSIUM 3.4 3.8 4.1   CHLORIDE 99 92* 90*   CO2 22 20 21   BUN 4* 7 7   ANIONGAP 6 9 10     Recent Labs   Lab Test 09/13/21  1158 09/13/21  0551 09/12/21  0626 09/11/21  1725   ALBUMIN 1.4* 1.2* 1.4* 1.8*   BILITOTAL  --  3.6* 5.7* 6.5*  6.6*   ALT  --  23 20 26   AST  --  69* " 69* 83*   LIPASE  --   --   --  256       I reviewed the patient's new imaging results.        CONSULTATION ASSESSMENT AND PLAN:    Danyelle Bustillo is a pleasant 55 year old female with newly diagnosed liver cirrhosis, hyponatremia, abdominal ascites, malnutrition and anemia. GI consulted on 9/14/21.    Active Problems:  Anemia  Hemoglobin 8.7->7.6->6.9 since admission with no known baseline. Stools non melenotic and not hematochezia or hematemesis  Concern for upper vs lower GI bleed vs bone marrow suppression    -- EGD tomorrow a.m.  -- NPO at midnight  -- Serial hemoglobin and transfuse for a hemoglobin of 7 or less  -- pantoprazole 40 mg BID  -- Will need colonoscopy- consider outpatient as remains hyponatremic ( sodium 117->127)    Alcohol abuse  Liver cirrhosis with ascites  Cholelithiasis with gallbladder wall thickening  Patient has long history of significant alcohol consumption  INR 1.68, Sodium 127, Creatinine 0.70 Tbili 6.6->3.6 MELD score 17  Patient not a candidate for liver transplant with recent alcohol consumption  Ammonia 34, Albumin 1.4  Alk Phos 207 AST 83, ALT 23  9/13 paracentesis removed 4 L of fluid  Imaging notes nodular liver concerning for liver cirrhosis. Also notes cholelithiasis with  gallbladder wall thickening-nonspecific in the setting of ascites. Recommend to consider HIDA scan- defer to surgery for evaluation  Findings consistent with liver cirrhosis likely from alcohol. Other causes would include hepatitis B or C vs autoimmune    -- Start albumin infusion 25 g over 2 hours TID for 9 doses. Hold with albumin of 3.5 or higher  --Daily labs cmp, inr, cbc  - Draw hep B and C labs, RISHABH and ASMA for autoimmune hepatitis  -- Alcohol cessation  -- Surgery consult for cholelithiasis and gallbladder wall thickening  -- Repeat paracentesis prn with increase in ascites  -- EGD tomorrow to determine if has any esophageal varices as well as anemia workup   -- Nutrition  consulted    Hyponatremia  Continue to monitor daily. Likely due to poor nutrition and liver cirrhosis        [facility]IA G[facility]mez [facility].  Office: [phone]  Cell : [phone] (Dr. Mcclendon)  Cell: [phone] (Octavia Garcia PA-C)

## 2021-09-14 NOTE — PROGRESS NOTES
[facility]    Progress Note- Hospitalist Service       Date of Admission:  9/11/2021    Assessment & Plan   Danyelle Bustillo is a 55 year old female with hx of alcohol abuse and lack of medical care who was admitted on 9/11/2021 for hyponatremia (Na 117) and painless jaundice    Hyponatremia,   Presented for evaluation following fall, which patient attributes to generalized weakness. In ED noted to have decompensated liver failure with jaundice, BLE edema, and ascites. Found to have severe hyponatremia with Na 117. She was given IV fluids initially and then further fluids held.  - Urine osmols low at 220 however Na<5 suggesting patient has intake of very low solute load to absorb any fluids  - Suspect that patient has two etiologies for her hyponatremia including liver failure and beer potomania.     - Na slowly improving on low rate IVF now up to 127. Will stop IVF today in light of getting another unit of blood to avoid further fluid overload with her ascites. Continue to trend Na off IVF for now to ensure it continues to elevate. If Na downtrends again recommend to restart at 75cc/hr     Elevated transaminases  New diagnosis of decompensated liver cirrhosis  On admission AST 83, ALT 26, Alk-P 207. Tbili 6.6. L  - Abdominal US with cirrhotic liver and cholelithiasis, no CBD dilation  - s/p paracentesis with ~4L of fluid removed 09/13. SAAG consistent with cirrhosis  - suspect cirrhosis is alcohol induced however hep panel pending  - no reported melena or active bleeding however Hb continues to downtrend, will get GI involved today to discuss need for EGD    Acute macrocytic anemia, unclear acute vs chronic  Admitted with Hgb 8.7 from unknown baseline, MCV>100  - iron low/normal, folate low and B12 high  - given folate supplementation since admission  - SPEP/UPEP ordered and remain pending  - Hb downtrend to 6.5 09/13 and given unit of blood however back down to 6.9 again this morning  - No  obvious bleeding however will get GI involved today as above    Alcohol use disorder  Long-standing history of heavy alcohol use; was consuming on average 6-8 beers daily, currently consuming 3-4 beers daily. Last drink: 9/10. She has no known history of alcohol withdrawal. She remains tachycardic.  - On CIWA protocol with diazepam  - On thiamine, folate, MVI  - On K/Mg/Phos replacement protocols  - patient decline CD consult today and told me she can stop on her own    Sinus tachycardia  - HR range 100-120s  - echo normal  - small improvement with valium however remains persistent  - monitor for now for clinical improvement as patient gets through withdrawal and with IVF    Mechanical fall due to generalized weakness, physical deconditioning  Severe protein calorie malnutrition in context of acute illness  - Work-up as noted above  - PT/OT> recommending home with assist  - Nutrition consult requested    Diet: regular diet  DVT Prophylaxis: Pneumatic Compression Devices  James Catheter: Not present  Code Status: Full Code FULL CODE      Disposition: Expected discharge several days after clinical improvement.    Cyndi Ball, [facility]  Contact information available via [facility] Paging/Directory      ______________________________________________________________________    Interval Hx  Patient reports feeling much better after para yesterday. No nausea and she is hungry. No fevers. No cough. She had a possible fall in the bathroom yesterday but did not hit head or LOC. Just slide down to her knees. She had one BM yesterday no melena. Edema about the same in legs.    Review of Systems    10 point Review of Systems was reviewed and pertinent positives and negatives are noted in the HPI      Prior to Admission Medications   None     Allergies   No Known Allergies    Physical Exam   Vital Signs: Temp: 98.6  F (37  C) Temp src: Oral BP: 102/61 Pulse: 114   Resp: 16 SpO2: 96 % O2 Device: None  (Room air)    Weight: 159 lbs 6.28 oz   Constitutional: Awake, alert, cooperative, no apparent distress, poor dentition  Respiratory: Clear to auscultation bilaterally, no crackles or wheezing  HEENT: noted scleral icterus, EOMI  Cardiovascular: tachycardic rate and normal rhythm, normal S1 and S2, and no murmur noted  GI: Normal bowel sounds, soft, +distended, not tender no obvious fluid wave  Skin/Integumen: No rashes, no cyanosis, +2 pitting edema in LE  No tremor noted       Data   Data reviewed today: I reviewed all medications, new labs and imaging results over the last 24 hours.     Recent Labs   Lab 09/14/21  0750 09/14/21  0007 09/13/21  1823 09/13/21  1158 09/13/21  0551 09/12/21  0626 09/11/21  2311 09/11/21  1725   WBC 7.0  --   --   --  9.3 10.2   < > 13.0*   HGB 6.9*  --   --   --  6.5* 7.6*   < > 8.7*   *  --   --   --  104* 104*   < > 105*   *  --   --   --  150 169   < > 200   INR  --   --   --   --   --  1.68*  --  1.53*   *  127* 124* 124* 124* 121* 121*  121*  --  117*   POTASSIUM 3.4  --   --   --  3.8 4.1  --  3.9   CHLORIDE 99  --   --   --  92* 90*  --  86*   CO2 22  --   --   --  20 21  --  21   BUN 4*  --   --   --  7 7  --  7   CR 0.70  --   --   --  0.71 0.67  --  0.76   ANIONGAP 6  --   --   --  9 10  --  10   BRIANA 7.3*  --   --   --  6.7* 7.5*  --  8.0*   *  --   --   --  101* 79  --  101*   ALBUMIN  --   --   --  1.4* 1.2* 1.4*  --  1.8*   PROTTOTAL  --   --   --   --  5.8* 6.7*  --  8.1   BILITOTAL  --   --   --   --  3.6* 5.7*  --  6.5*  6.6*   ALKPHOS  --   --   --   --  157* 168*  --  207*   ALT  --   --   --   --  23 20  --  26   AST  --   --   --   --  69* 69*  --  83*   LIPASE  --   --   --   --   --   --   --  256   TROPONIN  --   --   --   --   --   --   --  <0.015    < > = values in this interval not displayed.         Recent Results (from the past 24 hour(s))   US Paracentesis    Narrative    US PARACENTESIS 9/13/2021 3:22 PM    CLINICAL HISTORY:  HIGH VOLUME paracentesis with or without diagnostic  fluid analysis with labs to be drawn if ordered. Total paracentesis  volume as much as possible.    PROCEDURE: Informed consent obtained. Time out performed. The abdomen  was prepped and draped in a sterile fashion. 10 mL of 1% lidocaine was  infused into local soft tissues. A 5 Chinese catheter system was  introduced into the abdominal ascites under ultrasound guidance.    4 liters of clear fluid were removed and sent to lab if requested.    Patient tolerated procedure well.    Ultrasound imaging was obtained and placed in the patient's permanent  medical record.      Impression    IMPRESSION:  1.  Status post ultrasound-guided paracentesis.    BELGICA RODRIGUEZ MD         SYSTEM ID:  [id number]

## 2021-09-14 NOTE — PLAN OF CARE
Summary: Hyponatremia, Ascites, Cirrhosis      DATE & TIME: 9/14/2021 4629-3252  Cognitive Concerns/ Orientation : A&O x 4, calm and cooperative    BEHAVIOR & AGGRESSION TOOL COLOR: Green   CIWA SCORE: 0, q4hr  ABNL VS/O2: VSS except tachy (114) on RA  MOBILITY: SBA with a GB and walker  PAIN MANAGMENT: denies pain.   DIET: Regular diet, tolerating  BOWEL/BLADDER: Up to bathroom, incontinent at times, no BM this shift.  ABNL LAB/BG: Na checked Q8hr, Na-127, K-3.4 (recheck done at 1430), Phos- 2.1, Ca-7.3, Hgb 6.9 (will need 1 unit)   DRAIN/DEVICES: PIV, IV infusing albumin 50 mL/hr  TELEMETRY RHYTHM: ST  SKIN: pale, jaundiced; sclera yellow.   TESTS/PROCEDURES: Paracentesis done 9/13 (4L removed), EGD 9/15  D/C DAY/GOALS/PLACE: Discharge pending improvement of Na, hyponatremia work-up, and GI workup. Therapies recommending home with assist, home PT.   OTHER IMPORTANT INFO: Abdomen distended r/t ascites.  Band-aid on abdomen CDI. New Dx cirrhosis. Phos and Potassium replaced.

## 2021-09-15 ENCOUNTER — APPOINTMENT (OUTPATIENT)
Dept: PHYSICAL THERAPY | Facility: CLINIC | Age: 56
End: 2021-09-15
Payer: MEDICAID

## 2021-09-15 LAB
ALBUMIN SERPL-MCNC: 2.3 G/DL (ref 3.4–5)
ALP SERPL-CCNC: 102 U/L (ref 40–150)
ALT SERPL W P-5'-P-CCNC: 14 U/L (ref 0–50)
AMMONIA PLAS-SCNC: 56 UMOL/L (ref 10–50)
ANION GAP SERPL CALCULATED.3IONS-SCNC: 5 MMOL/L (ref 3–14)
AST SERPL W P-5'-P-CCNC: 39 U/L (ref 0–45)
BILIRUB DIRECT SERPL-MCNC: 2 MG/DL (ref 0–0.2)
BILIRUB SERPL-MCNC: 4.6 MG/DL (ref 0.2–1.3)
BUN SERPL-MCNC: 3 MG/DL (ref 7–30)
CALCIUM SERPL-MCNC: 7.6 MG/DL (ref 8.5–10.1)
CHLORIDE BLD-SCNC: 103 MMOL/L (ref 94–109)
CO2 SERPL-SCNC: 22 MMOL/L (ref 20–32)
CREAT SERPL-MCNC: 0.64 MG/DL (ref 0.52–1.04)
ERYTHROCYTE [DISTWIDTH] IN BLOOD BY AUTOMATED COUNT: 18.6 % (ref 10–15)
GFR SERPL CREATININE-BSD FRML MDRD: >90 ML/MIN/1.73M2
GLUCOSE BLD-MCNC: 101 MG/DL (ref 70–99)
HCT VFR BLD AUTO: 21.8 % (ref 35–47)
HGB BLD-MCNC: 7.8 G/DL (ref 11.7–15.7)
HGB BLD-MCNC: 8.1 G/DL (ref 11.7–15.7)
HGB BLD-MCNC: 8.1 G/DL (ref 11.7–15.7)
HGB BLD-MCNC: 8.7 G/DL (ref 11.7–15.7)
INR PPP: 2.31 (ref 0.85–1.15)
MCH RBC QN AUTO: 35.9 PG (ref 26.5–33)
MCHC RBC AUTO-ENTMCNC: 35.8 G/DL (ref 31.5–36.5)
MCV RBC AUTO: 101 FL (ref 78–100)
PATH REPORT.COMMENTS IMP SPEC: NORMAL
PATH REPORT.FINAL DX SPEC: NORMAL
PATH REPORT.GROSS SPEC: NORMAL
PATH REPORT.MICROSCOPIC SPEC OTHER STN: NORMAL
PATH REPORT.RELEVANT HX SPEC: NORMAL
PHOSPHATE SERPL-MCNC: 2.5 MG/DL (ref 2.5–4.5)
PLATELET # BLD AUTO: 128 10E3/UL (ref 150–450)
POTASSIUM BLD-SCNC: 4.1 MMOL/L (ref 3.4–5.3)
PROT SERPL-MCNC: 5.5 G/DL (ref 6.8–8.8)
RBC # BLD AUTO: 2.17 10E6/UL (ref 3.8–5.2)
SODIUM SERPL-SCNC: 130 MMOL/L (ref 133–144)
WBC # BLD AUTO: 7.9 10E3/UL (ref 4–11)

## 2021-09-15 PROCEDURE — 250N000009 HC RX 250: Performed by: INTERNAL MEDICINE

## 2021-09-15 PROCEDURE — 88112 CYTOPATH CELL ENHANCE TECH: CPT | Mod: 26 | Performed by: PATHOLOGY

## 2021-09-15 PROCEDURE — 43237 ENDOSCOPIC US EXAM ESOPH: CPT | Performed by: INTERNAL MEDICINE

## 2021-09-15 PROCEDURE — G0500 MOD SEDAT ENDO SERVICE >5YRS: HCPCS | Performed by: INTERNAL MEDICINE

## 2021-09-15 PROCEDURE — 85018 HEMOGLOBIN: CPT | Performed by: STUDENT IN AN ORGANIZED HEALTH CARE EDUCATION/TRAINING PROGRAM

## 2021-09-15 PROCEDURE — 84100 ASSAY OF PHOSPHORUS: CPT | Performed by: STUDENT IN AN ORGANIZED HEALTH CARE EDUCATION/TRAINING PROGRAM

## 2021-09-15 PROCEDURE — 120N000001 HC R&B MED SURG/OB

## 2021-09-15 PROCEDURE — 85014 HEMATOCRIT: CPT | Performed by: INTERNAL MEDICINE

## 2021-09-15 PROCEDURE — 250N000011 HC RX IP 250 OP 636: Performed by: INTERNAL MEDICINE

## 2021-09-15 PROCEDURE — 36415 COLL VENOUS BLD VENIPUNCTURE: CPT | Performed by: PHYSICIAN ASSISTANT

## 2021-09-15 PROCEDURE — 250N000013 HC RX MED GY IP 250 OP 250 PS 637: Performed by: PHYSICIAN ASSISTANT

## 2021-09-15 PROCEDURE — 85610 PROTHROMBIN TIME: CPT | Performed by: INTERNAL MEDICINE

## 2021-09-15 PROCEDURE — 36415 COLL VENOUS BLD VENIPUNCTURE: CPT | Performed by: INTERNAL MEDICINE

## 2021-09-15 PROCEDURE — 36415 COLL VENOUS BLD VENIPUNCTURE: CPT | Performed by: STUDENT IN AN ORGANIZED HEALTH CARE EDUCATION/TRAINING PROGRAM

## 2021-09-15 PROCEDURE — 86038 ANTINUCLEAR ANTIBODIES: CPT | Performed by: PHYSICIAN ASSISTANT

## 2021-09-15 PROCEDURE — 250N000013 HC RX MED GY IP 250 OP 250 PS 637: Performed by: INTERNAL MEDICINE

## 2021-09-15 PROCEDURE — P9047 ALBUMIN (HUMAN), 25%, 50ML: HCPCS | Performed by: PHYSICIAN ASSISTANT

## 2021-09-15 PROCEDURE — 83516 IMMUNOASSAY NONANTIBODY: CPT | Performed by: PHYSICIAN ASSISTANT

## 2021-09-15 PROCEDURE — 88305 TISSUE EXAM BY PATHOLOGIST: CPT | Mod: 26 | Performed by: PATHOLOGY

## 2021-09-15 PROCEDURE — 80048 BASIC METABOLIC PNL TOTAL CA: CPT | Performed by: INTERNAL MEDICINE

## 2021-09-15 PROCEDURE — 97116 GAIT TRAINING THERAPY: CPT | Mod: GP

## 2021-09-15 PROCEDURE — 97530 THERAPEUTIC ACTIVITIES: CPT | Mod: GP

## 2021-09-15 PROCEDURE — 82140 ASSAY OF AMMONIA: CPT | Performed by: PHYSICIAN ASSISTANT

## 2021-09-15 PROCEDURE — 99233 SBSQ HOSP IP/OBS HIGH 50: CPT | Performed by: INTERNAL MEDICINE

## 2021-09-15 PROCEDURE — 82248 BILIRUBIN DIRECT: CPT | Performed by: INTERNAL MEDICINE

## 2021-09-15 PROCEDURE — 0WJP8ZZ INSPECTION OF GASTROINTESTINAL TRACT, VIA NATURAL OR ARTIFICIAL OPENING ENDOSCOPIC APPROACH: ICD-10-PCS | Performed by: INTERNAL MEDICINE

## 2021-09-15 PROCEDURE — 250N000011 HC RX IP 250 OP 636: Performed by: PHYSICIAN ASSISTANT

## 2021-09-15 RX ORDER — FENTANYL CITRATE 50 UG/ML
INJECTION, SOLUTION INTRAMUSCULAR; INTRAVENOUS PRN
Status: COMPLETED | OUTPATIENT
Start: 2021-09-15 | End: 2021-09-15

## 2021-09-15 RX ORDER — NALOXONE HYDROCHLORIDE 0.4 MG/ML
0.4 INJECTION, SOLUTION INTRAMUSCULAR; INTRAVENOUS; SUBCUTANEOUS
Status: DISCONTINUED | OUTPATIENT
Start: 2021-09-15 | End: 2021-09-17 | Stop reason: HOSPADM

## 2021-09-15 RX ORDER — LIDOCAINE 40 MG/G
CREAM TOPICAL
Status: DISCONTINUED | OUTPATIENT
Start: 2021-09-15 | End: 2021-09-15 | Stop reason: HOSPADM

## 2021-09-15 RX ORDER — FLUMAZENIL 0.1 MG/ML
0.2 INJECTION, SOLUTION INTRAVENOUS
Status: ACTIVE | OUTPATIENT
Start: 2021-09-15 | End: 2021-09-16

## 2021-09-15 RX ORDER — NALOXONE HYDROCHLORIDE 0.4 MG/ML
0.2 INJECTION, SOLUTION INTRAMUSCULAR; INTRAVENOUS; SUBCUTANEOUS
Status: DISCONTINUED | OUTPATIENT
Start: 2021-09-15 | End: 2021-09-17 | Stop reason: HOSPADM

## 2021-09-15 RX ORDER — SPIRONOLACTONE 25 MG/1
50 TABLET ORAL DAILY
Status: DISCONTINUED | OUTPATIENT
Start: 2021-09-15 | End: 2021-09-16

## 2021-09-15 RX ORDER — FUROSEMIDE 20 MG
20 TABLET ORAL DAILY
Status: DISCONTINUED | OUTPATIENT
Start: 2021-09-15 | End: 2021-09-16

## 2021-09-15 RX ADMIN — FOLIC ACID 1 MG: 1 TABLET ORAL at 08:37

## 2021-09-15 RX ADMIN — MIDAZOLAM 1 MG: 1 INJECTION INTRAMUSCULAR; INTRAVENOUS at 16:54

## 2021-09-15 RX ADMIN — FENTANYL CITRATE 100 MCG: 50 INJECTION, SOLUTION INTRAMUSCULAR; INTRAVENOUS at 16:50

## 2021-09-15 RX ADMIN — PHYTONADIONE 5 MG: 10 INJECTION, EMULSION INTRAMUSCULAR; INTRAVENOUS; SUBCUTANEOUS at 17:32

## 2021-09-15 RX ADMIN — PANTOPRAZOLE SODIUM 40 MG: 40 TABLET, DELAYED RELEASE ORAL at 08:37

## 2021-09-15 RX ADMIN — ALBUMIN HUMAN 25 G: 0.25 SOLUTION INTRAVENOUS at 04:16

## 2021-09-15 RX ADMIN — PANTOPRAZOLE SODIUM 40 MG: 40 TABLET, DELAYED RELEASE ORAL at 17:32

## 2021-09-15 RX ADMIN — ALBUMIN HUMAN 25 G: 0.25 SOLUTION INTRAVENOUS at 11:23

## 2021-09-15 RX ADMIN — THIAMINE HCL TAB 100 MG 100 MG: 100 TAB at 08:37

## 2021-09-15 RX ADMIN — ALBUMIN HUMAN 25 G: 0.25 SOLUTION INTRAVENOUS at 18:41

## 2021-09-15 RX ADMIN — MIDAZOLAM 2 MG: 1 INJECTION INTRAMUSCULAR; INTRAVENOUS at 16:50

## 2021-09-15 RX ADMIN — SPIRONOLACTONE 50 MG: 25 TABLET ORAL at 11:21

## 2021-09-15 RX ADMIN — MULTIPLE VITAMINS W/ MINERALS TAB 1 TABLET: TAB at 08:37

## 2021-09-15 RX ADMIN — FUROSEMIDE 20 MG: 20 TABLET ORAL at 11:21

## 2021-09-15 RX ADMIN — TOPICAL ANESTHETIC 1 EACH: 200 SPRAY DENTAL; PERIODONTAL at 16:50

## 2021-09-15 ASSESSMENT — ACTIVITIES OF DAILY LIVING (ADL)
ADLS_ACUITY_SCORE: 23
ADLS_ACUITY_SCORE: 24
ADLS_ACUITY_SCORE: 23
ADLS_ACUITY_SCORE: 24
DEPENDENT_IADLS:: INDEPENDENT

## 2021-09-15 NOTE — CONSULTS
Care Management Initial Consult    General Information  Assessment completed with: Patient,    Type of CM/SW Visit: Offer D/C Planning    Primary Care Provider verified and updated as needed: Yes   Readmission within the last 30 days: no previous admission in last 30 days      Reason for Consult: discharge planning  Advance Care Planning: Advance Care Planning Reviewed: no concerns identified          Communication Assessment  Patient's communication style: spoken language (English or Bilingual)    Hearing Difficulty or Deaf: no   Wear Glasses or Blind: yes    Cognitive  Cognitive/Neuro/Behavioral: WDL        Orientation: disoriented to, situation  Mood/Behavior: cooperative  Best Language: 0 - No aphasia       Living Environment:   People in home: significant other     Current living Arrangements: house      Able to return to prior arrangements: yes       Family/Social Support:  Care provided by: self  Provides care for: no one, unable/limited ability to care for self  Marital Status: Single  Significant Other          Description of Support System: Supportive         Current Resources:   Patient receiving home care services: No     Community Resources:    Equipment currently used at home: grab bar, tub/shower, hospital bed  Supplies currently used at home:      Employment/Financial:  Employment Status: disabled        Financial Concerns: No concerns identified           Lifestyle & Psychosocial Needs:  Social Determinants of Health     Tobacco Use: Medium Risk     Smoking Tobacco Use: Former Smoker     Smokeless Tobacco Use: Never Used   Alcohol Use:      Frequency of Alcohol Consumption:      Average Number of Drinks:      Frequency of Binge Drinking:    Financial Resource Strain:      Difficulty of Paying Living Expenses:    Food Insecurity:      Worried About Running Out of Food in the Last Year:      Ran Out of Food in the Last Year:    Transportation Needs:      Lack of Transportation (Medical):      Lack of  Transportation (Non-Medical):    Physical Activity:      Days of Exercise per Week:      Minutes of Exercise per Session:    Stress:      Feeling of Stress :    Social Connections:      Frequency of Communication with Friends and Family:      Frequency of Social Gatherings with Friends and Family:      Attends [employer] Services:      Active Member of Clubs or Organizations:      Attends Club or Organization Meetings:      Marital Status:    Intimate Partner Violence:      Fear of Current or Ex-Partner:      Emotionally Abused:      Physically Abused:      Sexually Abused:    Depression:      PHQ-2 Score:    Housing Stability:      Unable to Pay for Housing in the Last Year:      Number of Places Lived in the Last Year:      Unstable Housing in the Last Year:        Functional Status:  Prior to admission patient needed assistance:   Dependent ADLs:: Independent  Dependent IADLs:: Independent  Assesssment of Functional Status: Not at baseline with ADL Functioning    Mental Health Status:  Mental Health Status: No Current Concerns       Chemical Dependency Status:  Chemical Dependency Status: Current Concern             Values/Beliefs:  Spiritual, Cultural Beliefs, [employer] Practices, Values that affect care: no               Additional Information:  Long discussion with patient and s/o regarding plan of care and discharge plans. Patient stating she lives with s/o who will be able to assist if needed.  Patient was not using AD but was having increased difficulty with mobility the last few weeks. S/O who works outside the home for 6-8 hrs. Patient states she has neighbors and family who will assist her if needed.  Per therapy Pt is well below her baseline mobility, currently unsteady with gait and needs AD for ambulation, SBA for transfers. Anticipate with medical management and therapies she will be able to discharge directly home with home PT and assist from s.o., and RN  Ignacio Ritter RN  Inpatient Care  Coordinator  [facility]  # [phone]

## 2021-09-15 NOTE — PLAN OF CARE
Summary: Hyponatremia, ascites, new dx Cirrhosis      DATE & TIME: 9/15/2021 1274-1056  Cognitive Concerns/ Orientation : A&O x 4, calm and cooperative    BEHAVIOR & AGGRESSION TOOL COLOR: Green   CIWA: 0  ABNL VS/O2: VSS except tachycardic on RA  MOBILITY: SBA with a GB and walker  PAIN MANAGMENT: denies pain.   DIET: NPO for EGD today  BOWEL/BLADDER: Up to bathroom, incontinent this shift, BM x1  ABNL LAB/BG: Hgb 8.1, Na+ 130  DRAIN/DEVICES: PIV SL- getting albumin IV 3x a day  TELEMETRY RHYTHM: ST  SKIN: pale, jaundiced; sclera yellow.   TESTS/PROCEDURES:  EGD 9/15 AM  D/C DAY/GOALS/PLACE: Discharge pending improvement of Na, hyponatremia work-up, and GI workup. Therapies recommending home with assist, home PT.   OTHER IMPORTANT INFO: Abdomen distended r/t ascites.  Band-aid on abdomen dried drainage. No signs of bleeding noted.

## 2021-09-15 NOTE — PROGRESS NOTES
[facility]    Progress Note- Hospitalist Service       Date of Admission:  9/11/2021    Assessment & Plan   Danyelle Bustillo is a 55 year old female with hx of alcohol abuse and lack of medical care who was admitted on 9/11/2021 for hyponatremia (Na 117) and painless jaundice    Hyponatremia- improving  Presented for evaluation following fall, which patient attributes to generalized weakness. In ED noted to have decompensated liver failure with jaundice, BLE edema, and ascites. Found to have severe hyponatremia with Na 117. She was given IV fluids initially and then further fluids held.  - Urine osmols low at 220 however Na<5 suggesting patient has intake of very low solute load to absorb any fluids  - Suspect that patient has two etiologies for her hyponatremia including liver failure and beer potomania.     - Na slowly improving on low rate IVF up to 127.   - iv fluids stopped  - Na up to 130 today  - BMP in am, given the fact that she was started on diuretics    Elevated transaminases  New diagnosis of decompensated liver cirrhosis  Ascites   On admission AST 83, ALT 26, Alk-P 207. Tbili 6.6. L; INR 1.53   - Abdominal US with cirrhotic liver and cholelithiasis, no CBD dilation  - s/p paracentesis with ~4L of fluid removed 09/13. SAAG consistent with cirrhosis  - suspect cirrhosis is alcohol induced however hep panel pending  - no reported melena or active bleeding however Hb continues to downtrend, GI consulted- plan for EGD- see below  - started on Albumin TID infusionX9 doses  - started on Lasix 20 mg po daily and Aldactone 50 mg po daily today as per GI  - may need paracentesis prn as outpatient   - LFTs, including transaminase and bili improving  - ammonia level trending up 34--56  - mentation seems to be OK  - recheck ammonia level in am    - INR trending up 1.68--2.31; will give vit K 5 mg po dailyX 3 doses;   - RISHABH, hepatitis panel pending  - follow up with GI as outpatient in 1  week.  - complete alcohol drinking recommended      Acute macrocytic anemia, unclear acute vs chronic  Admitted with Hgb 8.7 from unknown baseline, MCV>100  - iron low/normal, folate low and B12 high  - given folate supplementation since admission  - started pn PPI 40 mg po daily  - SPEP/UPEP ordered- pending  - Hb downtrend to 6.5 09/13 and given unit of blood however back down to 6.9 again ton 9/14  - another unit of PRBCs transfused, repeat Hb 8.8  - No obvious bleeding   - GI consult appreciated  - plan for EGD today  - plan for colonoscopy as outpatient  - Hb stable 7.8--8.7--7.8  - Hb in am  - plan to transfuse if Hb<7    Alcohol use disorder  Long-standing history of heavy alcohol use; was consuming on average 6-8 beers daily, currently consuming 3-4 beers daily. Last drink: 9/10. She has no known history of alcohol withdrawal. She remains tachycardic.  - On CIWA protocol with diazepam- not getting any benzo in the last few days  - On thiamine, folate, MVI  - On K/Mg/Phos replacement protocols  - patient decline CD consult today and told me she can stop on her own    Cholelithiasis  - US abd- 9/11- Cholelithiasis with gallbladder wall thickening, nonspecific in the setting of ascites  - surgery consult appreciated  - no abd pain, no fever, no leucocytosis   - no clinical evidence of cholecystitis  - no indication for clap rodrigo at this time  - if she would develop abd pain/RUQ pain- would proceed with HIDA scan    Sinus tachycardia  - HR range 100-120s  - echo normal  - small improvement with valium however remains persistent  - monitor for now for clinical improvement as patient gets through withdrawal and with IVF    Mechanical fall due to generalized weakness, physical deconditioning  Severe protein calorie malnutrition in context of acute illness  - Work-up as noted above  - PT/OT> recommending home HHC  - Nutrition consult requested    Hypokalemia  Hypophosphatemia  - on electrolytes replacement  protocol    Diet: regular diet  DVT Prophylaxis: Pneumatic Compression Devices  James Catheter: Not present  Code Status: Full Code FULL CODE      Disposition: Expected discharge in 1-2 days to home with home PT/OT/RN.    Time Spent on this Encounter   I spent 35 minutes on the unit/floor reviewing the chart and managing the care of Danyelle Bustillo. Over 50% of my time was spent on the following:   - Counseling the patient and/or family regarding: diagnostic results, prognosis, recommended follow-up and medical compliance  - Coordination of care with the: GI consultant(s), care coordinator/ and nurse    Albina Arango MD  [facility]  Contact information available via [facility] Paging/Directory      ______________________________________________________________________    Interval Hx  - Feeling fine  - denies chest pain, no SOB, no N/V, no abd pain  - apparently was slightly confused in the morning, mentation improved later on   - states leg swellings improved      Allergies   No Known Allergies    Physical Exam   Vital Signs: Temp: 99.1  F (37.3  C) Temp src: Oral BP: 100/59 Pulse: 119   Resp: 16 SpO2: 95 % O2 Device: None (Room air)    Weight: 159 lbs 6.28 oz     Constitutional: Awake, alert, cooperative, no apparent distress, poor dentition  Respiratory: Clear to auscultation bilaterally, no crackles or wheezing  HEENT: noted scleral icterus, EOMI  Cardiovascular: tachycardic rate and normal rhythm, normal S1 and S2, and no murmur noted  GI: Normal bowel sounds, soft, +distended, not tender, BS present  Skin/Integumen: No rashes, no cyanosis, trace edema LE  Neuro: awake, alert, orientedX3, a little slow, no NDs  Psych- normal mood, normal affect   Extremities- trace edema b.l LE    Data   Data reviewed today: I reviewed all medications, new labs and imaging results over the last 24 hours.     Recent Labs   Lab 09/15/21  1226 09/15/21  0646 09/15/21  0024 09/14/21  2111  09/14/21  2111 09/14/21  1421 09/14/21  0750 09/14/21  0750 09/13/21  1823 09/13/21  1158 09/13/21  0551 09/13/21  0551 09/12/21  0833 09/12/21  0626 09/11/21  2311 09/11/21  1725   WBC  --  7.9  --   --   --   --   --  7.0  --   --   --  9.3   < > 10.2   < > 13.0*   HGB 8.7* 7.8* 8.1*   < > 8.8*  --    < > 6.9*  --   --    < > 6.5*   < > 7.6*   < > 8.7*   MCV  --  101*  --   --   --   --   --  104*  --   --   --  104*   < > 104*   < > 105*   PLT  --  128*  --   --   --   --   --  144*  --   --   --  150   < > 169   < > 200   INR  --  2.31*  --   --   --   --   --   --   --   --   --   --   --  1.68*  --  1.53*   NA  --  130*  --   --  130* 129*   < > 127*  127*   < > 124*   < > 121*   < > 121*  121*   < > 117*   POTASSIUM  --  4.1  --   --   --  3.8  --  3.4  --   --    < > 3.8   < > 4.1  --  3.9   CHLORIDE  --  103  --   --   --   --   --  99  --   --   --  92*   < > 90*  --  86*   CO2  --  22  --   --   --   --   --  22  --   --   --  20   < > 21  --  21   BUN  --  3*  --   --   --   --   --  4*  --   --   --  7   < > 7  --  7   CR  --  0.64  --   --   --   --   --  0.70  --   --   --  0.71   < > 0.67  --  0.76   ANIONGAP  --  5  --   --   --   --   --  6  --   --   --  9   < > 10  --  10   BRIANA  --  7.6*  --   --   --   --   --  7.3*  --   --   --  6.7*   < > 7.5*  --  8.0*   GLC  --  101*  --   --   --   --   --  116*  --   --   --  101*   < > 79  --  101*   ALBUMIN  --  2.3*  --   --   --   --   --   --   --  1.4*   < > 1.2*   < > 1.4*  --  1.8*   PROTTOTAL  --  5.5*  --   --   --   --   --   --   --   --   --  5.8*   < > 6.7*  --  8.1   BILITOTAL  --  4.6*  --   --   --   --   --   --   --   --   --  3.6*   < > 5.7*  --  6.5*  6.6*   ALKPHOS  --  102  --   --   --   --   --   --   --   --   --  157*   < > 168*  --  207*   ALT  --  14  --   --   --   --   --   --   --   --   --  23   < > 20  --  26   AST  --  39  --   --   --   --   --   --   --   --   --  69*   < > 69*  --  83*   LIPASE  --   --   --   --    --   --   --   --   --   --   --   --   --   --   --  256   TROPONIN  --   --   --   --   --   --   --   --   --   --   --   --   --   --   --  <0.015    < > = values in this interval not displayed.         No results found for this or any previous visit (from the past 24 hour(s)).   Current Facility-Administered Medications   Medication     acetaminophen (TYLENOL) tablet 650 mg    Or     acetaminophen (TYLENOL) Suppository 650 mg     albumin human 25 % injection 25 g     bisacodyl (DULCOLAX) Suppository 10 mg     diazepam (VALIUM) tablet 10 mg    Or     diazepam (VALIUM) injection 5-10 mg     flumazenil (ROMAZICON) injection 0.2 mg     folic acid (FOLVITE) tablet 1 mg     furosemide (LASIX) tablet 20 mg     lidocaine (LMX4) cream     lidocaine 1 % 0.1-1 mL     melatonin tablet 5 mg     multivitamin w/minerals (THERA-VIT-M) tablet 1 tablet     ondansetron (ZOFRAN-ODT) ODT tab 4 mg    Or     ondansetron (ZOFRAN) injection 4 mg     pantoprazole (PROTONIX) EC tablet 40 mg     polyethylene glycol (MIRALAX) Packet 17 g     prochlorperazine (COMPAZINE) injection 10 mg    Or     prochlorperazine (COMPAZINE) tablet 10 mg    Or     prochlorperazine (COMPAZINE) suppository 25 mg     senna-docusate (SENOKOT-S/PERICOLACE) 8.6-50 MG per tablet 1 tablet    Or     senna-docusate (SENOKOT-S/PERICOLACE) 8.6-50 MG per tablet 2 tablet     sodium chloride (PF) 0.9% PF flush 3 mL     sodium chloride (PF) 0.9% PF flush 3 mL     sodium chloride (PF) 0.9% PF flush 3 mL     spironolactone (ALDACTONE) tablet 50 mg     thiamine (B-1) tablet 100 mg

## 2021-09-15 NOTE — PLAN OF CARE
Summary: Hyponatremia, ascites, new dx Cirrhosis      DATE & TIME: 9/15/2021 (1751-9612)  Cognitive Concerns/ Orientation : A&O x 3, disoriented to situation this am, pt stated did not sleep overnight, Pt oriented x4 this afternoon    BEHAVIOR & AGGRESSION TOOL COLOR: Green   CIWA: 0, 0, 0  ABNL VS/O2: VSS except tachycardic on RA, -120's  MOBILITY: SBA with a GB and walker, up in chair, ambulates to bathroom with nursing   PAIN MANAGMENT: denies pain.   DIET: 2 Gram NA   BOWEL/BLADDER: Up to bathroom, incontinent at times, no BM this shift   ABNL LAB/BG: , K 4.1, Hemoglobin 8.7, Phosphorus 2.5, INR 2.31, ammonia 56  DRAIN/DEVICES: PIV SL- getting albumin IV 3x a day  TELEMETRY RHYTHM: Sinus tach  SKIN: pale, jaundiced; sclera yellow.   TESTS/PROCEDURES:  EGD/EUS done today 9/15   D/C DAY/GOALS/PLACE: Discharge pending improvement of Na, hyponatremia work-up, and GI workup. Therapies recommending home with assist, home PT.   OTHER IMPORTANT INFO: Abdomen distended r/t ascites.  Band-aid on abdomen dried drainage from previous paracentesis. No signs of bleeding noted.  Patient restarted on diuretics today per GI.

## 2021-09-15 NOTE — PROGRESS NOTES
[facility]  Gastroenterology Progress Note     Danyelle Bustillo MRN# [id number]   YOB: 1965 Age: 55 year old          Assessment and Plan:   Danyelle Bustillo is a pleasant 55 year old female with newly diagnosed liver cirrhosis, hyponatremia, abdominal ascites, malnutrition and anemia. GI consulted on 9/14/21.     Active Problems:  Anemia  Hemoglobin 8.7->7.6->6.9->8.8->7.8 since admission with no known baseline. Stools non melenotic and not hematochezia or hematemesis  Concern for upper vs lower GI bleed vs bone marrow suppression     -- EGD today  -- NPO at midnight  -- Serial hemoglobin and transfuse for a hemoglobin of 7 or less  -- pantoprazole 40 mg BID  -- Will need colonoscopy inpatient vs outpatient     Alcohol abuse  Liver cirrhosis with ascites  Cholelithiasis with gallbladder wall thickening  Patient has long history of significant alcohol consumption  INR 1.68->2.31, Sodium 127->130, Creatinine 0.64 Tbili 6.6->3.6->4.6 MELD score 21  Patient not a candidate for liver transplant with recent alcohol consumption  Ammonia 34, Albumin 1.4->2.3  Alk Phos 207->102 AST 83->39, ALT 23->14  9/13 paracentesis removed 4 L of fluid  Imaging notes nodular liver concerning for liver cirrhosis. Also notes cholelithiasis with  gallbladder wall thickening-nonspecific in the setting of ascites. Findings consistent with liver cirrhosis likely from alcohol. Other causes would include hepatitis B or C vs autoimmune  Patient has had episodes of strange comments- concern for HE- check ammonia    -- Continue albumin infusion 25 g over 2 hours TID for 9 doses. Hold with albumin of 3.5 or higher  -- Daily labs cmp, inr, cbc  - Draw hep B and C labs, RISHABH and ASMA for autoimmune hepatitis (pending)  -- Alcohol cessation   -- EGD/EUS today to determine if has any esophageal varices as well as anemia workup and visualize gallbladder and bile ducts  -- Electrolytes improved, creatinine normal-  start diuretics slowly ( start on furosemide 20 mg daily and spironolactone 50 mg daily) for ascites  -- Repeat paracentesis prn with increase in ascites  -- Nutrition consulted- appreciate  -- Appreciate surgery consult  -- Aware of elevated INR- due to cirrhosis no plans for biopsy on EGD or EUS- therefore no need for FFP or to correct  -- Recheck ammonia today     Hyponatremia  Continue to monitor daily. Likely due to poor nutrition and liver cirrhosis               Interval History:   Patient has had episodes of strange comments- concern for HE- check ammonia              Review of Systems:   C: NEGATIVE for fever, chills, change in weight  E/M: NEGATIVE for ear, mouth and throat problems  R: NEGATIVE for significant cough or SOB  CV: NEGATIVE for chest pain, palpitations or peripheral edema             Medications:   I have reviewed this patient's current medications    albumin human  25 g Intravenous Q8H     folic acid  1 mg Oral Daily     multivitamin w/minerals  1 tablet Oral Daily     pantoprazole  40 mg Oral BID AC     sodium chloride (PF)  3 mL Intracatheter Q8H     thiamine  100 mg Oral Daily                  Physical Exam:   Vitals were reviewed  Vital Signs with Ranges  Temp:  [97.7  F (36.5  C)-99.4  F (37.4  C)] 99.1  F (37.3  C)  Pulse:  [115-121] 119  Resp:  [16] 16  BP: ()/(50-77) 100/59  SpO2:  [95 %-100 %] 95 %  I/O last 3 completed shifts:  In: 1070 [P.O.:720]  Out: -   Constitutional: healthy, alert, no distress and cooperative   Cardiovascular: negative, PMI normal. No lifts, heaves, or thrills. RRR. No murmurs, clicks gallops or rub  Respiratory: negative, Percussion normal. Good diaphragmatic excursion. Lungs clear  Abdomen: Abdomen soft, non-tender. BS normal. No masses, organomegaly           Data:   I reviewed the patient's new clinical lab test results.   Recent Labs   Lab Test 09/15/21  0646 09/15/21  0024 09/14/21  2111 09/14/21  0750 09/14/21  0750 09/13/21  0551 09/13/21  0551  09/12/21  0626 09/12/21  0626 09/11/21  2345 09/11/21  1725   WBC 7.9  --   --   --  7.0  --  9.3   < > 10.2   < > 13.0*   HGB 7.8* 8.1* 8.8*   < > 6.9*   < > 6.5*   < > 7.6*   < > 8.7*   *  --   --   --  104*  --  104*   < > 104*   < > 105*   *  --   --   --  144*  --  150   < > 169   < > 200   INR 2.31*  --   --   --   --   --   --   --  1.68*  --  1.53*    < > = values in this interval not displayed.     Recent Labs   Lab Test 09/15/21  0646 09/14/21  1421 09/14/21  0750 09/13/21  0551 09/13/21  0551   POTASSIUM 4.1 3.8 3.4   < > 3.8   CHLORIDE 103  --  99  --  92*   CO2 22  --  22  --  20   BUN 3*  --  4*  --  7   ANIONGAP 5  --  6  --  9    < > = values in this interval not displayed.     Recent Labs   Lab Test 09/15/21  0646 09/13/21  1158 09/13/21  0551 09/12/21  0626 09/12/21  0626 09/11/21  1725 09/11/21  1725   ALBUMIN 2.3* 1.4* 1.2*   < > 1.4*   < > 1.8*   BILITOTAL 4.6*  --  3.6*  --  5.7*   < > 6.5*  6.6*   ALT 14  --  23  --  20   < > 26   AST 39  --  69*  --  69*   < > 83*   LIPASE  --   --   --   --   --   --  256    < > = values in this interval not displayed.       I reviewed the patient's new imaging results.    All laboratory data reviewed  All imaging studies reviewed by me.    Octavia Garcia PA-C,  9/15/2021  [facility]  Office : [phone]  Cell: [phone] (Dr. Mcclendon)  Cell: [phone] (Octavia Garcia PA-C)

## 2021-09-15 NOTE — PLAN OF CARE
Summary: Hyponatremia, Ascites, Cirrhosis      DATE & TIME: 9/14/2021 3802-9405  Cognitive Concerns/ Orientation : A&O x 4, calm and cooperative    BEHAVIOR & AGGRESSION TOOL COLOR: Green   CIWA: 0  ABNL VS/O2: VSS except tachycardic on RA  MOBILITY: SBA with a GB and walker  PAIN MANAGMENT: denies pain.   DIET: Regular diet- NPO at MN  BOWEL/BLADDER: Up to bathroom, incontinent this shift, no BM today  ABNL LAB/BG: Na checked Q8hr, Na-129 & 130, K-3.8, Phos- 2.1 (replacing recheck in AM),  Hgb 6.9 (infused one unit)- rechecked, now 8.8  DRAIN/DEVICES: PIV SL- getting albumin IV 3x a day  TELEMETRY RHYTHM: ST  SKIN: pale, jaundiced; sclera yellow.   TESTS/PROCEDURES:  EGD 9/15 AM  D/C DAY/GOALS/PLACE: Discharge pending improvement of Na, hyponatremia work-up, and GI workup. Therapies recommending home with assist, home PT.   OTHER IMPORTANT INFO: Abdomen distended r/t ascites.  Band-aid on abdomen CDI. New Dx cirrhosis.

## 2021-09-16 ENCOUNTER — APPOINTMENT (OUTPATIENT)
Dept: PHYSICAL THERAPY | Facility: CLINIC | Age: 56
End: 2021-09-16
Payer: MEDICAID

## 2021-09-16 ENCOUNTER — APPOINTMENT (OUTPATIENT)
Dept: ULTRASOUND IMAGING | Facility: CLINIC | Age: 56
End: 2021-09-16
Attending: INTERNAL MEDICINE
Payer: MEDICAID

## 2021-09-16 ENCOUNTER — APPOINTMENT (OUTPATIENT)
Dept: OCCUPATIONAL THERAPY | Facility: CLINIC | Age: 56
End: 2021-09-16
Payer: MEDICAID

## 2021-09-16 LAB
ALBUMIN SERPL-MCNC: 3 G/DL (ref 3.4–5)
ALP SERPL-CCNC: 83 U/L (ref 40–150)
ALT SERPL W P-5'-P-CCNC: 14 U/L (ref 0–50)
AMMONIA PLAS-SCNC: 82 UMOL/L (ref 10–50)
ANA SER QL IF: NEGATIVE
ANION GAP SERPL CALCULATED.3IONS-SCNC: 7 MMOL/L (ref 3–14)
ANION GAP SERPL CALCULATED.3IONS-SCNC: 9 MMOL/L (ref 3–14)
AST SERPL W P-5'-P-CCNC: 36 U/L (ref 0–45)
BILIRUB SERPL-MCNC: 4.3 MG/DL (ref 0.2–1.3)
BUN SERPL-MCNC: 3 MG/DL (ref 7–30)
BUN SERPL-MCNC: 4 MG/DL (ref 7–30)
CALCIUM SERPL-MCNC: 7.9 MG/DL (ref 8.5–10.1)
CALCIUM SERPL-MCNC: 8.1 MG/DL (ref 8.5–10.1)
CHLORIDE BLD-SCNC: 100 MMOL/L (ref 94–109)
CHLORIDE BLD-SCNC: 101 MMOL/L (ref 94–109)
CO2 SERPL-SCNC: 20 MMOL/L (ref 20–32)
CO2 SERPL-SCNC: 21 MMOL/L (ref 20–32)
CREAT SERPL-MCNC: 0.7 MG/DL (ref 0.52–1.04)
CREAT SERPL-MCNC: 0.73 MG/DL (ref 0.52–1.04)
GFR SERPL CREATININE-BSD FRML MDRD: >90 ML/MIN/1.73M2
GFR SERPL CREATININE-BSD FRML MDRD: >90 ML/MIN/1.73M2
GLUCOSE BLD-MCNC: 116 MG/DL (ref 70–99)
GLUCOSE BLD-MCNC: 116 MG/DL (ref 70–99)
HBV CORE AB SERPL QL IA: NONREACTIVE
HBV SURFACE AB SERPL IA-ACNC: 0 M[IU]/ML
HBV SURFACE AG SERPL QL IA: NONREACTIVE
HCV AB SERPL QL IA: NONREACTIVE
HGB BLD-MCNC: 7.8 G/DL (ref 11.7–15.7)
HGB BLD-MCNC: 7.8 G/DL (ref 11.7–15.7)
HGB BLD-MCNC: 8.2 G/DL (ref 11.7–15.7)
INR PPP: 2.06 (ref 0.85–1.15)
POTASSIUM BLD-SCNC: 3.3 MMOL/L (ref 3.4–5.3)
POTASSIUM BLD-SCNC: 3.3 MMOL/L (ref 3.4–5.3)
POTASSIUM BLD-SCNC: 3.6 MMOL/L (ref 3.4–5.3)
PROT SERPL-MCNC: 6 G/DL (ref 6.8–8.8)
SMA IGG SER-ACNC: 14 UNITS
SODIUM SERPL-SCNC: 129 MMOL/L (ref 133–144)
SODIUM SERPL-SCNC: 129 MMOL/L (ref 133–144)
UPPER EUS: NORMAL
UPPER GI ENDOSCOPY: NORMAL

## 2021-09-16 PROCEDURE — 250N000013 HC RX MED GY IP 250 OP 250 PS 637: Performed by: INTERNAL MEDICINE

## 2021-09-16 PROCEDURE — P9047 ALBUMIN (HUMAN), 25%, 50ML: HCPCS | Performed by: PHYSICIAN ASSISTANT

## 2021-09-16 PROCEDURE — 97530 THERAPEUTIC ACTIVITIES: CPT | Mod: GO

## 2021-09-16 PROCEDURE — 999N000154 HC STATISTIC RADIOLOGY XRAY, US, CT, MAR, NM

## 2021-09-16 PROCEDURE — 99233 SBSQ HOSP IP/OBS HIGH 50: CPT | Performed by: INTERNAL MEDICINE

## 2021-09-16 PROCEDURE — 250N000013 HC RX MED GY IP 250 OP 250 PS 637: Performed by: PHYSICIAN ASSISTANT

## 2021-09-16 PROCEDURE — 97110 THERAPEUTIC EXERCISES: CPT | Mod: GP

## 2021-09-16 PROCEDURE — 36415 COLL VENOUS BLD VENIPUNCTURE: CPT | Performed by: INTERNAL MEDICINE

## 2021-09-16 PROCEDURE — 82140 ASSAY OF AMMONIA: CPT | Performed by: INTERNAL MEDICINE

## 2021-09-16 PROCEDURE — 84132 ASSAY OF SERUM POTASSIUM: CPT | Performed by: INTERNAL MEDICINE

## 2021-09-16 PROCEDURE — 80048 BASIC METABOLIC PNL TOTAL CA: CPT | Performed by: INTERNAL MEDICINE

## 2021-09-16 PROCEDURE — 85018 HEMOGLOBIN: CPT | Performed by: STUDENT IN AN ORGANIZED HEALTH CARE EDUCATION/TRAINING PROGRAM

## 2021-09-16 PROCEDURE — 250N000009 HC RX 250: Performed by: INTERNAL MEDICINE

## 2021-09-16 PROCEDURE — 250N000013 HC RX MED GY IP 250 OP 250 PS 637: Performed by: HOSPITALIST

## 2021-09-16 PROCEDURE — 49083 ABD PARACENTESIS W/IMAGING: CPT

## 2021-09-16 PROCEDURE — 97535 SELF CARE MNGMENT TRAINING: CPT | Mod: GO

## 2021-09-16 PROCEDURE — 36415 COLL VENOUS BLD VENIPUNCTURE: CPT | Performed by: STUDENT IN AN ORGANIZED HEALTH CARE EDUCATION/TRAINING PROGRAM

## 2021-09-16 PROCEDURE — 250N000011 HC RX IP 250 OP 636: Performed by: PHYSICIAN ASSISTANT

## 2021-09-16 PROCEDURE — 97116 GAIT TRAINING THERAPY: CPT | Mod: GP

## 2021-09-16 PROCEDURE — 250N000011 HC RX IP 250 OP 636: Performed by: INTERNAL MEDICINE

## 2021-09-16 PROCEDURE — 120N000001 HC R&B MED SURG/OB

## 2021-09-16 PROCEDURE — 85610 PROTHROMBIN TIME: CPT | Performed by: INTERNAL MEDICINE

## 2021-09-16 PROCEDURE — 272N000706 US PARACENTESIS

## 2021-09-16 PROCEDURE — 80053 COMPREHEN METABOLIC PANEL: CPT | Performed by: PHYSICIAN ASSISTANT

## 2021-09-16 PROCEDURE — 0W9G3ZZ DRAINAGE OF PERITONEAL CAVITY, PERCUTANEOUS APPROACH: ICD-10-PCS | Performed by: RADIOLOGY

## 2021-09-16 RX ORDER — POTASSIUM CHLORIDE 1500 MG/1
40 TABLET, EXTENDED RELEASE ORAL ONCE
Status: COMPLETED | OUTPATIENT
Start: 2021-09-16 | End: 2021-09-16

## 2021-09-16 RX ORDER — LIDOCAINE HYDROCHLORIDE 10 MG/ML
10 INJECTION, SOLUTION EPIDURAL; INFILTRATION; INTRACAUDAL; PERINEURAL ONCE
Status: COMPLETED | OUTPATIENT
Start: 2021-09-16 | End: 2021-09-16

## 2021-09-16 RX ORDER — LACTULOSE 10 G/15ML
20 SOLUTION ORAL 2 TIMES DAILY
Status: DISCONTINUED | OUTPATIENT
Start: 2021-09-16 | End: 2021-09-17 | Stop reason: HOSPADM

## 2021-09-16 RX ORDER — SPIRONOLACTONE 25 MG/1
100 TABLET ORAL DAILY
Status: DISCONTINUED | OUTPATIENT
Start: 2021-09-17 | End: 2021-09-17 | Stop reason: HOSPADM

## 2021-09-16 RX ORDER — FUROSEMIDE 40 MG
40 TABLET ORAL DAILY
Status: DISCONTINUED | OUTPATIENT
Start: 2021-09-17 | End: 2021-09-17 | Stop reason: HOSPADM

## 2021-09-16 RX ORDER — PANTOPRAZOLE SODIUM 40 MG/1
40 TABLET, DELAYED RELEASE ORAL
Status: DISCONTINUED | OUTPATIENT
Start: 2021-09-17 | End: 2021-09-17 | Stop reason: HOSPADM

## 2021-09-16 RX ADMIN — LACTULOSE 20 G: 20 SOLUTION ORAL at 11:13

## 2021-09-16 RX ADMIN — FUROSEMIDE 20 MG: 20 TABLET ORAL at 08:15

## 2021-09-16 RX ADMIN — POTASSIUM CHLORIDE 40 MEQ: 1500 TABLET, EXTENDED RELEASE ORAL at 08:15

## 2021-09-16 RX ADMIN — ALBUMIN HUMAN 25 G: 0.25 SOLUTION INTRAVENOUS at 04:01

## 2021-09-16 RX ADMIN — ALBUMIN HUMAN 25 G: 0.25 SOLUTION INTRAVENOUS at 18:50

## 2021-09-16 RX ADMIN — SPIRONOLACTONE 50 MG: 25 TABLET ORAL at 08:15

## 2021-09-16 RX ADMIN — PANTOPRAZOLE SODIUM 40 MG: 40 TABLET, DELAYED RELEASE ORAL at 08:15

## 2021-09-16 RX ADMIN — LACTULOSE 20 G: 20 SOLUTION ORAL at 20:14

## 2021-09-16 RX ADMIN — MULTIPLE VITAMINS W/ MINERALS TAB 1 TABLET: TAB at 08:15

## 2021-09-16 RX ADMIN — THIAMINE HCL TAB 100 MG 100 MG: 100 TAB at 08:15

## 2021-09-16 RX ADMIN — LIDOCAINE HYDROCHLORIDE 10 ML: 10 INJECTION, SOLUTION EPIDURAL; INFILTRATION; INTRACAUDAL; PERINEURAL at 16:09

## 2021-09-16 RX ADMIN — ALBUMIN HUMAN 25 G: 0.25 SOLUTION INTRAVENOUS at 11:13

## 2021-09-16 RX ADMIN — PHYTONADIONE 5 MG: 10 INJECTION, EMULSION INTRAMUSCULAR; INTRAVENOUS; SUBCUTANEOUS at 08:16

## 2021-09-16 RX ADMIN — FOLIC ACID 1 MG: 1 TABLET ORAL at 08:15

## 2021-09-16 ASSESSMENT — ACTIVITIES OF DAILY LIVING (ADL)
ADLS_ACUITY_SCORE: 16
ADLS_ACUITY_SCORE: 23
ADLS_ACUITY_SCORE: 16
ADLS_ACUITY_SCORE: 23
ADLS_ACUITY_SCORE: 23
ADLS_ACUITY_SCORE: 16

## 2021-09-16 NOTE — PROGRESS NOTES
[facility] Progress Note  [facility] 67    Visited pt due to length of stay. I introduced myself to pt and friend who was present. Pt shared that she is hungry and friend is ordering her food.    Pt reported that she has no SH concerns at this time and hopes to leave here tomorrow. She wondered aloud if her cats miss her and shared that she is well cared for by the staff and is happy to be feeling better. I offered pt words of our care and support for her healing.    I offered reflective listening and affirmation of her feelings, experience, and meaning.    [facility]S remains available.      Missy Graham  Chaplain Resident

## 2021-09-16 NOTE — PLAN OF CARE
Summary: Hyponatremia, ascites, new dx Cirrhosis      DATE & TIME: 9/15/2021 (1900-0730)  Cognitive Concerns/ Orientation : A/O x 4  BEHAVIOR & AGGRESSION TOOL COLOR: Green   CIWA: 0  ABNL VS/O2: VSS except tachycardic on RA  MOBILITY: SBA with a GB and walker, up in chair  PAIN MANAGMENT: denies pain.   DIET: 2g sodium diet  BOWEL/BLADDER: Up to bathroom, incontinent at times   ABNL LAB/BG: waiting on morning labs  DRAIN/DEVICES: PIV SL- getting albumin IV 3x a day  TELEMETRY RHYTHM: Sinus tach  SKIN: pale, jaundiced; sclera yellow.   TESTS/PROCEDURES:  EGD done 9/15   D/C DAY/GOALS/PLACE: Discharge pending improvement of Na, hyponatremia work-up, and GI workup. Therapies recommending home with assist, home PT.   OTHER IMPORTANT INFO: Abdomen distended r/t ascites. Reported no nausea or abdominal discomfort. Reported no pain. No signs of bleeding noted. One of her teeth was found on her gown.

## 2021-09-16 NOTE — PROGRESS NOTES
Paracentesis   2000 mL Straw fluid removed from abdominal space  Patient tolerated procedure well.  Dressing CDI, no swelling or hematoma.  Report called to receiving RN.

## 2021-09-16 NOTE — PROGRESS NOTES
[facility]  Gastroenterology Progress Note     Danyelle Bustillo MRN# [id number]   YOB: 1965 Age: 55 year old          Assessment and Plan:   Danyelle Bustillo is a pleasant 55 year old female with newly diagnosed liver cirrhosis, hyponatremia, abdominal ascites, malnutrition and anemia. GI consulted on 9/14/21.     Active Problems:  Anemia  Hemoglobin now stable at 7-8. Stools non melenotic and not hematochezia or hematemesis  9/15/21 EGD noted grade I esophageal varices. NO source of anemia     -- pantoprazole 40 mg daily  -- Will need colonoscopy outpatient for screening     Alcohol abuse  Liver cirrhosis with ascites  Cholelithiasis with gallbladder wall thickening  Hepatic encepalopathy  -Patient has long history of significant alcohol consumption  -INR 2.06, Sodium 129, Creatinine 0.70 Tbili 6.6->3.6->4.6 MELD score 20  -Patient not a candidate for liver transplant with recent alcohol consumption  -Ammonia 82, Albumin 1.4->2.3 (recheck pending)  -Alk Phos 207->102 AST 83->39, ALT 23->14  -9/13 paracentesis removed 4 L of fluid  -Imaging notes nodular liver concerning for liver cirrhosis. Also notes cholelithiasis with  gallbladder -wall thickening-nonspecific in the setting of ascites. Findings consistent with liver cirrhosis likely from alcohol. Other causes would include hepatitis B or C vs autoimmune(results pending)  -9/15 EUS noted cholelithiasis with no bile duct dilation of choledocholithiasis. Pancreas had appearance of mild-moderate chronic pancreatitis.    -- start lactulose 10 mg/15ml two tablespoons twice daily  -- Continue albumin infusion 25 g over 2 hours TID for 9 doses. Hold with albumin of 3.5 or higher  -- Daily labs cmp, inr, cbc  -- Hep B and C labs, RISHABH and ASMA for autoimmune hepatitis (pending)  -- Alcohol cessation   -- Tolerating diuretics- increase to lasix 40 mg daily and aldactone 100 mg daily  -- Repeat paracentesis prn with increase in  ascites  -- Nutrition consulted- appreciate  -- Patient needs extremely close follow up. Ok with GI to discharge patient with follow up in one week.    Hyponatremia  Continue to monitor daily. Likely due to poor nutrition and liver cirrhosis  Follow 2g sodium diet                Interval History:   no new complaints, doing well, denies chest pain, denies shortness of breath, denies abdominal pain, alert, oriented to person, place and time, has had a bowel movement in the last 24 hours and doing well; no cp, sob, n/v/d, or abd pain.              Review of Systems:   C: NEGATIVE for fever, chills, change in weight  E/M: NEGATIVE for ear, mouth and throat problems  R: NEGATIVE for significant cough or SOB  CV: NEGATIVE for chest pain, palpitations or peripheral edema             Medications:   I have reviewed this patient's current medications    albumin human  25 g Intravenous Q8H     folic acid  1 mg Oral Daily     furosemide  20 mg Oral Daily     multivitamin w/minerals  1 tablet Oral Daily     pantoprazole  40 mg Oral BID AC     phytonadione  5 mg Oral Daily     sodium chloride (PF)  3 mL Intracatheter Q8H     spironolactone  50 mg Oral Daily                  Physical Exam:   Vitals were reviewed  Vital Signs with Ranges  Temp:  [98.6  F (37  C)-99.2  F (37.3  C)] 98.8  F (37.1  C)  Pulse:  [109-122] 121  Resp:  [12-25] 20  BP: ()/(58-78) 112/69  SpO2:  [91 %-100 %] 91 %  No intake/output data recorded.  Constitutional: healthy, alert and no distress   Cardiovascular: negative, PMI normal. No lifts, heaves, or thrills. RRR. No murmurs, clicks gallops or rub  Respiratory: negative, Percussion normal. Good diaphragmatic excursion. Lungs clear  Abdomen: Abdomen soft, non-tender. BS normal. No masses, organomegaly           Data:   I reviewed the patient's new clinical lab test results.   Recent Labs   Lab Test 09/16/21  0917 09/16/21  0619 09/16/21  0010 09/15/21  2001 09/15/21  1226 09/15/21  0646  09/14/21  2111 09/14/21  0750 09/13/21  0551 09/13/21  0551 09/12/21  0626 09/12/21  0626   WBC  --   --   --   --   --  7.9  --  7.0  --  9.3   < > 10.2   HGB  --  7.8* 7.8* 8.1*   < > 7.8*   < > 6.9*   < > 6.5*   < > 7.6*   MCV  --   --   --   --   --  101*  --  104*  --  104*   < > 104*   PLT  --   --   --   --   --  128*  --  144*  --  150   < > 169   INR 2.06*  --   --   --   --  2.31*  --   --   --   --   --  1.68*    < > = values in this interval not displayed.     Recent Labs   Lab Test 09/16/21  0619 09/15/21  0646 09/14/21  1421 09/14/21  0750 09/14/21  0750   POTASSIUM 3.3* 4.1 3.8   < > 3.4   CHLORIDE 101 103  --   --  99   CO2 21 22  --   --  22   BUN 3* 3*  --   --  4*   ANIONGAP 7 5  --   --  6    < > = values in this interval not displayed.     Recent Labs   Lab Test 09/15/21  0646 09/13/21  1158 09/13/21  0551 09/12/21  0626 09/12/21  0626 09/11/21  1725 09/11/21  1725   ALBUMIN 2.3* 1.4* 1.2*   < > 1.4*   < > 1.8*   BILITOTAL 4.6*  --  3.6*  --  5.7*   < > 6.5*  6.6*   ALT 14  --  23  --  20   < > 26   AST 39  --  69*  --  69*   < > 83*   LIPASE  --   --   --   --   --   --  256    < > = values in this interval not displayed.       I reviewed the patient's new imaging results.    All laboratory data reviewed  All imaging studies reviewed by me.    Octavia Garcia PA-C,  9/16/2021  [facility]  Office : [phone]  Cell: [phone] (Dr. Mcclendon)  Cell: [phone] (Octavia Garcia PA-C)

## 2021-09-16 NOTE — PLAN OF CARE
Summary: Hyponatremia, ascites, new dx Cirrhosis      DATE & TIME: 9/16/21 (6921-0283)  Cognitive Concerns/ Orientation : A/O x 4, calm and cooperative   BEHAVIOR & AGGRESSION TOOL COLOR: Green   CIWA: 0, 0, 0  ABNL VS/O2: VSS except tachycardic on RA  MOBILITY: SBA with a GB and walker, up in chair, ambulated in hallways x2 with therapy  PAIN MANAGMENT: denies pain.   DIET: 2g sodium diet, tolerating 1200 FR  BOWEL/BLADDER: Up to bathroom, incontinent at times, no BM   ABNL LAB/BG: Hemoglobin 8.2, , K 3.3, replaced, recheck level 3.6, ammonia level 82 (started on Lactulose BID today per GI)  DRAIN/DEVICES: PIV SL- getting albumin IV 3x a day  TELEMETRY RHYTHM: Sinus tach  SKIN: pale, jaundiced; sclera yellow.   TESTS/PROCEDURES:  EGD done 9/15, paracentesis repeated today, removed 2000 ml, site CDI with liquid bandage  D/C DAY/GOALS/PLACE: Pending home tomorrow with home care  OTHER IMPORTANT INFO: GI following

## 2021-09-17 ENCOUNTER — APPOINTMENT (OUTPATIENT)
Dept: PHYSICAL THERAPY | Facility: CLINIC | Age: 56
End: 2021-09-17
Payer: MEDICAID

## 2021-09-17 VITALS
HEIGHT: 66 IN | TEMPERATURE: 98.9 F | SYSTOLIC BLOOD PRESSURE: 109 MMHG | HEART RATE: 119 BPM | DIASTOLIC BLOOD PRESSURE: 68 MMHG | RESPIRATION RATE: 18 BRPM | BODY MASS INDEX: 25.62 KG/M2 | WEIGHT: 159.39 LBS | OXYGEN SATURATION: 94 %

## 2021-09-17 LAB
AMMONIA PLAS-SCNC: 38 UMOL/L (ref 10–50)
ANION GAP SERPL CALCULATED.3IONS-SCNC: 6 MMOL/L (ref 3–14)
BUN SERPL-MCNC: 3 MG/DL (ref 7–30)
CALCIUM SERPL-MCNC: 8.1 MG/DL (ref 8.5–10.1)
CHLORIDE BLD-SCNC: 103 MMOL/L (ref 94–109)
CO2 SERPL-SCNC: 23 MMOL/L (ref 20–32)
CREAT SERPL-MCNC: 0.71 MG/DL (ref 0.52–1.04)
GFR SERPL CREATININE-BSD FRML MDRD: >90 ML/MIN/1.73M2
GLUCOSE BLD-MCNC: 98 MG/DL (ref 70–99)
INR PPP: 2.41 (ref 0.85–1.15)
POTASSIUM BLD-SCNC: 3.5 MMOL/L (ref 3.4–5.3)
SODIUM SERPL-SCNC: 132 MMOL/L (ref 133–144)

## 2021-09-17 PROCEDURE — 250N000009 HC RX 250: Performed by: INTERNAL MEDICINE

## 2021-09-17 PROCEDURE — 97530 THERAPEUTIC ACTIVITIES: CPT | Mod: GP

## 2021-09-17 PROCEDURE — 36415 COLL VENOUS BLD VENIPUNCTURE: CPT | Performed by: INTERNAL MEDICINE

## 2021-09-17 PROCEDURE — 250N000013 HC RX MED GY IP 250 OP 250 PS 637: Performed by: INTERNAL MEDICINE

## 2021-09-17 PROCEDURE — 82140 ASSAY OF AMMONIA: CPT | Performed by: INTERNAL MEDICINE

## 2021-09-17 PROCEDURE — 85610 PROTHROMBIN TIME: CPT | Performed by: INTERNAL MEDICINE

## 2021-09-17 PROCEDURE — 82374 ASSAY BLOOD CARBON DIOXIDE: CPT | Performed by: INTERNAL MEDICINE

## 2021-09-17 PROCEDURE — 250N000011 HC RX IP 250 OP 636: Performed by: INTERNAL MEDICINE

## 2021-09-17 PROCEDURE — P9047 ALBUMIN (HUMAN), 25%, 50ML: HCPCS | Performed by: PHYSICIAN ASSISTANT

## 2021-09-17 PROCEDURE — 97116 GAIT TRAINING THERAPY: CPT | Mod: GP

## 2021-09-17 PROCEDURE — 250N000011 HC RX IP 250 OP 636: Performed by: PHYSICIAN ASSISTANT

## 2021-09-17 PROCEDURE — 250N000013 HC RX MED GY IP 250 OP 250 PS 637: Performed by: PHYSICIAN ASSISTANT

## 2021-09-17 PROCEDURE — 99239 HOSP IP/OBS DSCHRG MGMT >30: CPT | Performed by: INTERNAL MEDICINE

## 2021-09-17 RX ORDER — MULTIPLE VITAMINS W/ MINERALS TAB 9MG-400MCG
1 TAB ORAL DAILY
COMMUNITY
Start: 2021-09-18

## 2021-09-17 RX ORDER — FUROSEMIDE 40 MG
40 TABLET ORAL DAILY
Qty: 30 TABLET | Refills: 0 | Status: SHIPPED | OUTPATIENT
Start: 2021-09-18

## 2021-09-17 RX ORDER — FOLIC ACID 1 MG/1
1 TABLET ORAL DAILY
COMMUNITY
Start: 2021-09-18

## 2021-09-17 RX ORDER — LACTULOSE 10 G/15ML
20 SOLUTION ORAL 2 TIMES DAILY
Qty: 180 ML | Refills: 0 | Status: SHIPPED | OUTPATIENT
Start: 2021-09-17 | End: 2021-09-21

## 2021-09-17 RX ORDER — PANTOPRAZOLE SODIUM 40 MG/1
40 TABLET, DELAYED RELEASE ORAL
Qty: 30 TABLET | Refills: 0 | Status: SHIPPED | OUTPATIENT
Start: 2021-09-18

## 2021-09-17 RX ORDER — SPIRONOLACTONE 100 MG/1
100 TABLET, FILM COATED ORAL DAILY
Qty: 30 TABLET | Refills: 0 | Status: SHIPPED | OUTPATIENT
Start: 2021-09-18

## 2021-09-17 RX ADMIN — MULTIPLE VITAMINS W/ MINERALS TAB 1 TABLET: TAB at 08:35

## 2021-09-17 RX ADMIN — LACTULOSE 20 G: 20 SOLUTION ORAL at 08:34

## 2021-09-17 RX ADMIN — PHYTONADIONE 5 MG: 10 INJECTION, EMULSION INTRAMUSCULAR; INTRAVENOUS; SUBCUTANEOUS at 08:37

## 2021-09-17 RX ADMIN — FUROSEMIDE 40 MG: 40 TABLET ORAL at 08:35

## 2021-09-17 RX ADMIN — PANTOPRAZOLE SODIUM 40 MG: 40 TABLET, DELAYED RELEASE ORAL at 08:35

## 2021-09-17 RX ADMIN — SPIRONOLACTONE 100 MG: 25 TABLET ORAL at 08:34

## 2021-09-17 RX ADMIN — ALBUMIN HUMAN 25 G: 0.25 SOLUTION INTRAVENOUS at 03:09

## 2021-09-17 RX ADMIN — FOLIC ACID 1 MG: 1 TABLET ORAL at 08:35

## 2021-09-17 ASSESSMENT — ACTIVITIES OF DAILY LIVING (ADL)
ADLS_ACUITY_SCORE: 14

## 2021-09-17 NOTE — PROGRESS NOTES
Care Management Discharge Note    Discharge Date: 09/17/2021       Discharge Disposition: Home    Discharge Services:  Home care RN/PT/OT    Discharge DME:  walker    Discharge Transportation: family or friend will provide    Private pay costs discussed: Not applicable    PAS Confirmation Code:  NA  Patient/family educated on Medicare website which has current facility and service quality ratings:  yes    Education Provided on the Discharge Plan:  yes  Persons Notified of Discharge Plans: yes  Patient/Family in Agreement with the Plan: yes     Handoff Referral Completed:  Pt does not have a PCP.  She will establish care at [facility] next week    Additional Information:  A  referral was sent to [facility], they are reviewing and will call back.  Pt has MA, so it may be difficult to find a facility that would accept due to insurance and also agencies are full.  Pt will establish care on 9/21/21 at [facility]  Pt declined offer for CD help  Pt has transportation home.   She will need to take a walker home with her.        Raquel Marie RN   Inpatient Care Management  [phone]

## 2021-09-17 NOTE — PROGRESS NOTES
[facility]    Medicine Progress Note - Hospitalist Service       Date of Admission:  9/11/2021    Assessment & Plan           Danyelle Bustillo is a 55 year old female with hx of alcohol abuse and lack of medical care who was admitted on 9/11/2021 for hyponatremia (Na 117) and painless jaundice     Hyponatremia- improving  Presented for evaluation following fall, which patient attributes to generalized weakness. In ED noted to have decompensated liver failure with jaundice, BLE edema, and ascites. Found to have severe hyponatremia with Na 117. She was given IV fluids initially and then further fluids held.  - Urine osmols low at 220 however Na<5 suggesting patient has intake of very low solute load to absorb any fluids  - Suspect that patient has two etiologies for her hyponatremia including liver failure and beer potomania.      - Na slowly improving on low rate IVF up to 127.   - iv fluids stopped  - Na up to 130--129  today  - BMP in am, given the fact that she was started on diuretics     Elevated transaminases  New diagnosis of decompensated liver cirrhosis  Ascites   On admission AST 83, ALT 26, Alk-P 207. Tbili 6.6. L; INR 1.53   - Abdominal US with cirrhotic liver and cholelithiasis, no CBD dilation  - s/p paracentesis with ~4L of fluid removed 09/13. SAAG consistent with cirrhosis  - suspect cirrhosis is alcohol induced however hep panel pending  - no reported melena or active bleeding however Hb continues to downtrend, GI consulted- plan for EGD- see below  - started on Albumin TID infusionX9 doses  - started on Lasix 20 mg po daily and Aldactone 50 mg po daily today as per GI; today 09/16- increased Lasix to 40 mg po daily and Aldactone to 100 mg po daily  - will repeat paracentesis before discharge    - may need paracentesis prn as outpatient   - LFTs, including transaminase and bili improving  - ammonia level trending up 34--56--82  - mentation seems to be OK  - started on  lactulose 20 gm po BID  - recheck ammonia level in am     - INR trending up 1.68--2.31; ordered vit K 5 mg po dailyX 3 doses; INR today 2.06  - RISHABH, hepatitis panel pending  - follow up with GI as outpatient in 1 week.  - complete alcohol drinking recommended       Acute macrocytic anemia, unclear acute vs chronic  Admitted with Hgb 8.7 from unknown baseline, MCV>100  - iron low/normal, folate low and B12 high  - given folate supplementation since admission  - started pn PPI 40 mg po daily  - SPEP/UPEP ordered- pending  - Hb downtrend to 6.5 09/13 and given unit of blood however back down to 6.9 again ton 9/14  - another unit of PRBCs transfused, repeat Hb 8.8  - No obvious bleeding   - GI consult appreciated  - EGD 09/16- was consistent with portal gastropathy grade 1 esophageal varices no signs of active bleeding.  Endoscopic ultrasound findings consistent with abdominal ascites gallbladder wall thickening up to 6.5 mm consistent with congestion multiple gallstones normal common bile duct without any retained stone or sludge.  Liver findings were consistent with nodular appearing liver.  - plan for colonoscopy as outpatient  - Hb stable 7.8--8.7--7.8  - Hb in am  - plan to transfuse if Hb<7     Alcohol use disorder  Long-standing history of heavy alcohol use; was consuming on average 6-8 beers daily, currently consuming 3-4 beers daily. Last drink: 9/10. She has no known history of alcohol withdrawal. She remains tachycardic.  - On CIWA protocol with diazepam- not getting any benzo in the last few days  - On thiamine, folate, MVI  - On K/Mg/Phos replacement protocols  - patient decline CD consult today and told me she can stop on her own     Cholelithiasis  - US abd- 9/11- Cholelithiasis with gallbladder wall thickening, nonspecific in the setting of ascites  - surgery consult appreciated  - no abd pain, no fever, no leucocytosis   - no clinical evidence of cholecystitis  - no indication for clap rodrigo at this  time  - if she would develop abd pain/RUQ pain- would proceed with HIDA scan     Sinus tachycardia  - HR range 100-120s  - echo normal  - small improvement with valium however remains persistent  - monitor for now for clinical improvement as patient gets through withdrawal and with IVF     Mechanical fall due to generalized weakness, physical deconditioning  Severe protein calorie malnutrition in context of acute illness  - Work-up as noted above  - PT/OT> recommending home HHC  - Nutrition consult requested     Hypokalemia  Hypophosphatemia  - on electrolytes replacement protocol       Diet: 2 Gram Sodium Diet  Snacks/Supplements Adult: Other; 10: carnation; 2: ensure; Between Meals  Fluid restriction 1200 ML FLUID    DVT Prophylaxis: Pneumatic Compression Devices  James Catheter: Not present  Central Lines: None  Code Status: Full Code      Disposition Plan   Expected discharge: 09/17/2021   recommended to prior living arrangement once after paracentesis, if ammonia level decreases.     The patient's care was discussed with the Bedside Nurse, Care Coordinator/ and Patient.    Time Spent on this Encounter   I spent 35 minutes on the unit/floor managing the care of Danyelle Bustillo. Over 50% of my time was spent on the following:   - Counseling the patient and/or family regarding: diagnostic results, prognosis and medical compliance  - Coordination of care with the: care coordinator/ and nurse    Albina Arango MD  Hospitalist Service  [facility]  Securely message with the Vocera Web Console (learn more here)  Text page via [employer] Paging/Directory      Clinically Significant Risk Factors Present on Admission                ______________________________________________________________________    Interval History   Doing fine, appetite is good, no N/V, no abd pain  - did not have a BM, states that usually it's hard for her to have a BM if she is not at home  -  no chest pain, no SOB  - agrees to have repeat paracentesis before going home.     Data reviewed today: I reviewed all medications, new labs and imaging results over the last 24 hours. I personally reviewed the EGD image(s) showing as above.    Physical Exam   Vital Signs: Temp: 98.9  F (37.2  C) Temp src: Oral BP: 109/68 Pulse: 119   Resp: 18 SpO2: 94 % O2 Device: None (Room air)    Weight: 159 lbs 6.28 oz     Constitutional: Awake, alert, cooperative, no apparent distress, poor dentition  Respiratory: Clear to auscultation bilaterally, no crackles or wheezing  HEENT: noted scleral icterus, EOMI  Cardiovascular: tachycardic rate and normal rhythm, normal S1 and S2, and no murmur noted  GI: Normal bowel sounds, soft, +distended, not tender, BS present  Skin/Integumen: No rashes, no cyanosis  Neuro: awake, alert, orientedX3, a little slow, no NDs  Psych- normal mood, normal affect   Extremities- trace edema b.l LE       Data   Recent Labs   Lab 09/17/21  0849 09/17/21  0848 09/16/21  1443 09/16/21  0917 09/16/21  0619 09/16/21  0010 09/15/21  1226 09/15/21  0646 09/15/21  0646 09/14/21  1421 09/14/21  0750 09/13/21  1158 09/13/21  0551 09/11/21  2311 09/11/21  1725   WBC  --   --   --   --   --   --   --   --  7.9  --  7.0  --  9.3   < > 13.0*   HGB  --   --  8.2*  --  7.8* 7.8*   < >  --  7.8*   < > 6.9*  --  6.5*   < > 8.7*   MCV  --   --   --   --   --   --   --   --  101*  --  104*  --  104*   < > 105*   PLT  --   --   --   --   --   --   --   --  128*  --  144*  --  150   < > 200   INR  --  2.41*  --  2.06*  --   --   --   --  2.31*  --   --   --   --    < > 1.53*   *  --   --   --  129*  129*  --   --   --  130*   < > 127*  127*   < > 121*   < > 117*   POTASSIUM 3.5  --  3.6  --  3.3*  3.3*  --   --    < > 4.1   < > 3.4  --  3.8   < > 3.9   CHLORIDE 103  --   --   --  100  101  --   --   --  103  --  99  --  92*   < > 86*   CO2 23  --   --   --  20  21  --   --   --  22  --  22  --  20   < > 21   BUN  3*  --   --   --  4*  3*  --   --   --  3*  --  4*  --  7   < > 7   CR 0.71  --   --   --  0.73  0.70  --   --   --  0.64  --  0.70  --  0.71   < > 0.76   ANIONGAP 6  --   --   --  9  7  --   --   --  5  --  6  --  9   < > 10   BRIANA 8.1*  --   --   --  8.1*  7.9*  --   --   --  7.6*  --  7.3*  --  6.7*   < > 8.0*   GLC 98  --   --   --  116*  116*  --   --   --  101*  --  116*  --  101*   < > 101*   ALBUMIN  --   --   --   --  3.0*  --   --   --  2.3*  --   --    < > 1.2*   < > 1.8*   PROTTOTAL  --   --   --   --  6.0*  --   --   --  5.5*  --   --   --  5.8*   < > 8.1   BILITOTAL  --   --   --   --  4.3*  --   --   --  4.6*  --   --   --  3.6*   < > 6.5*  6.6*   ALKPHOS  --   --   --   --  83  --   --   --  102  --   --   --  157*   < > 207*   ALT  --   --   --   --  14  --   --   --  14  --   --   --  23   < > 26   AST  --   --   --   --  36  --   --   --  39  --   --   --  69*   < > 83*   LIPASE  --   --   --   --   --   --   --   --   --   --   --   --   --   --  256   TROPONIN  --   --   --   --   --   --   --   --   --   --   --   --   --   --  <0.015    < > = values in this interval not displayed.     Recent Results (from the past 24 hour(s))   US Paracentesis    Narrative    ULTRASOUND PARACENTESIS 9/16/2021 4:27 PM     HISTORY: Ascites, increased abdominal distention.    FINDINGS: Ultrasound was used to evaluate for the presence and best  approach for paracentesis. Written and oral informed consent was  obtained. A pause for the cause procedure to verify the correct  patient and correct procedure. The skin overlying the right lower  quadrant was prepped and draped in the usual sterile fashion. The  subcutaneous tissues were anesthetized with 10 mL 1% Lidocaine. A  catheter was advanced into the peritoneal space and 2 L of  straw  colored fluid was drained. There were no immediate complications.  Ultrasound images were permanently stored.  Patient left the  ultrasound suite in satisfactory condition.       Impression    IMPRESSION: Technically successful paracentesis without immediate  complications.     Medications       folic acid  1 mg Oral Daily     furosemide  40 mg Oral Daily     lactulose  20 g Oral BID     multivitamin w/minerals  1 tablet Oral Daily     pantoprazole  40 mg Oral QAM AC     sodium chloride (PF)  3 mL Intracatheter Q8H     spironolactone  100 mg Oral Daily

## 2021-09-17 NOTE — PLAN OF CARE
Physical Therapy Discharge Summary    Reason for therapy discharge:    Patient has met goals for safe D/C home with s.o. to assist at recommended levels of assist.    Progress towards therapy goal(s). See goals on Care Plan in [facility] electronic health record for goal details.  Goals met    Therapy recommendation(s):    Patient is safe to D/C home with s.o. assist for; bed mobility min A, transfers and gait w/ FWW SBA, 3 stairs w/ 1 railing SBA. Recommended continue PT within home to increase activity tolerance and mobility to increase ease with ambulation and stairs.

## 2021-09-17 NOTE — DISCHARGE SUMMARY
[facility]  Hospitalist Discharge Summary      Date of Admission:  9/11/2021  Date of Discharge:  9/17/2021  2:02 PM  Discharging Provider: Albina Arango MD      Discharge Diagnoses   New diagnosis of decompensated liver cirrhosis  Ascites   Alcohol use disorder  Hyponatremia  Macrocytic anemia  Cholelithiasis   Hypokalemia  Hypophosphatemia   Severe protein caloric malnutrition  Mechanical fall  Deconditioning      Follow-ups Needed After Discharge   Follow-up Appointments     Follow-up and recommended labs and tests       Follow up with primary care provider, [facility], within 7   days for hospital follow- up.  The following labs/tests are recommended:   BMP, LFTs, Hemoglobin.  An appointment has been scheduled for you:  Aleida Merchant PA-C  Monday 9/20/21 at 11:20 AM, please see below    Follow up with GI- Dr Mcclendon in 1 week.  [facility] will call   you with appointment time.  If you do not receive a call from this clinic   in a few days, please call             Unresulted Labs Ordered in the Past 30 Days of this Admission     Date and Time Order Name Status Description    9/13/2021 10:19 PM Ascites Fluid Aerobic Bacterial Culture Routine Preliminary       These results will be followed up by GI.    Discharge Disposition   Discharged to home  Condition at discharge: Good      Hospital Course      Danyelle Bustillo is a 55 year old female with hx of alcohol abuse and lack of medical care who was admitted on 9/11/2021 for hyponatremia (Na 117) and painless jaundice. For a detailed HPI- please refer to H&P done by Dr Devorah Ray on 09/11/2021.     Hyponatremia- improving  Presented for evaluation following fall, which patient attributes to generalized weakness. In ED noted to have decompensated liver failure with jaundice, BLE edema, and ascites. Found to have severe hyponatremia with Na 117.   - Urine osmols low at 220 however Na<5 suggesting  patient has intake of very low solute load to absorb any fluids  - Suspect that patient has two etiologies for her hyponatremia including liver failure and beer potomania.   - started on mild fluid hydration  - Na slowly improving 118--121--124--127, then iv fluids stopped.  - Na continued to improve 129--132  - she was started on diuretics as per GI- see below which may further affect Na level  - follow up with PMD on Monday and repeat BMP.     Elevated transaminases  New diagnosis of decompensated liver cirrhosis  Ascites   - On admission AST 83, ALT 26, Alk-P 207. Tbili 6.6. L; INR 1.53   - Abdominal US with cirrhotic liver and cholelithiasis, no CBD dilation  - s/p paracentesis with ~4L of fluid removed 09/13. SAAG consistent with cirrhosis  - suspect cirrhosis is alcohol induced however hep panel negative; RISHABH negative  - no reported melena or active bleeding however Hb continues to downtrend, GI consulted- plan for EGD- see below  - started on Albumin TID infusionX9 doses  - once her Na improved - she was started on Lasix 20 mg po daily and Aldactone 50 mg po daily as per GI and further increased Lasix to 40 mg po daily and Aldactone to 100 mg po daily  - had repeat paracentesis on 09/16- 2000cc fluid drained  - may need paracentesis prn as outpatient   - LFTs, including transaminase and bili improving  - 1200cc fluid restriction recommended  - ammonia level trending up 34--56--82 on 09/16  - she seemed a little confused on 09/15  - started on lactulose 20 gm po BID  - recheck ammonia level on 09/17- down to 38; mentation cleared      - INR trending up 1.68--2.31; ordered vit K 5 mg po dailyX 3 doses;   - RISHABH, hepatitis panel pending  - follow up with GI as outpatient in 1 week.  - complete alcohol drinking recommended       Acute macrocytic anemia, unclear acute vs chronic  Admitted with Hgb 8.7 from unknown baseline, MCV>100  - iron low/normal, folate low and B12 high  - given folate supplementation since  admission  - started pn PPI 40 mg po daily  - SPEP/UPEP ordered- pending  - Hb downtrend to 6.5 09/13 and given unit of blood however back down to 6.9 again ton 9/14  - another unit of PRBCs transfused, repeat Hb 8.8  - No obvious bleeding   - GI consult appreciated  - EGD 09/16- was consistent with portal gastropathy grade 1 esophageal varices no signs of active bleeding.  - EUS findings consistent with abdominal ascites gallbladder wall thickening up to 6.5 mm consistent with congestion multiple gallstones normal common bile duct without any retained stone or sludge.  Liver findings were consistent with nodular appearing liver.  - plan for colonoscopy as outpatient  - Hb stable 7.8--8.7--7.8--8.2     Alcohol use disorder  Long-standing history of heavy alcohol use; was consuming on average 6-8 beers daily, currently consuming 3-4 beers daily. Last drink: 9/10. She has no known history of alcohol withdrawal. She remains tachycardic.  - On CIWA protocol with diazepam- not getting any benzo in the last few days  - On thiamine, folate, MVI  - On K/Mg/Phos replacement protocols  - patient decline CD consult and said she can stop on her own     Cholelithiasis  - US abd- 9/11- Cholelithiasis with gallbladder wall thickening, nonspecific in the setting of ascites  - surgery consult appreciated  - no abd pain, no fever, no leucocytosis   - no clinical evidence of cholecystitis  - no indication for lap rodrigo at this time  - if she would develop abd pain/RUQ pain- would proceed with HIDA scan     Sinus tachycardia  - HR range 100-120s  - echo normal  - likely due to intravascular depletion/deconditioning      Mechanical fall due to generalized weakness, physical deconditioning  Severe protein calorie malnutrition in context of acute illness  - Work-up as noted above  - PT/OT-  recommending home HHC  - Nutrition consult requested     Hypokalemia  Hypophosphatemia  - on electrolytes replacement protocol    Consultations This  Hospital Stay   PHYSICAL THERAPY ADULT IP CONSULT  CARE MANAGEMENT / SOCIAL WORK IP CONSULT  OCCUPATIONAL THERAPY ADULT IP CONSULT  GASTROENTEROLOGY IP CONSULT  SURGERY GENERAL IP CONSULT  NUTRITION SERVICES ADULT IP CONSULT    Code Status   Full Code    Time Spent on this Encounter   I, Albina Arango MD, personally saw the patient today and spent greater than 30 minutes discharging this patient.       Albina Arango MD  [place] [address] [facility]  [address] [place] [address]  Phone: [phone]  ______________________________________________________________________    Physical Exam   Vital Signs: Temp: 98.9  F (37.2  C) Temp src: Oral BP: 109/68 Pulse: 119   Resp: 18 SpO2: 94 % O2 Device: None (Room air)    Weight: 159 lbs 6.28 oz     Constitutional: Awake, alert, cooperative, no apparent distress, poor dentition  Respiratory: Clear to auscultation bilaterally, no crackles or wheezing  HEENT: noted scleral icterus, EOMI  Cardiovascular: tachycardic rate and normal rhythm, normal S1 and S2, and no murmur noted  GI: Normal bowel sounds, soft, +distended, not tender, BS present  Skin/Integumen: No rashes, no cyanosis  Neuro: awake, alert, orientedX3, a little slow, no NDs  Psych- normal mood, normal affect   Extremities- trace edema b.l LE          Primary Care Physician   [facility]    Discharge Orders      Home Care PT Referral for Hospital Discharge      Home Care OT Referral for Hospital Discharge      Home care nursing referral      Reason for your hospital stay    Decompensated liver cirrhosis  Hyponatremia- improved.  Anemia     Follow-up and recommended labs and tests     Follow up with primary care provider, [facility], within 7 days for hospital follow- up.  The following labs/tests are recommended: BMP, LFTs, Hemoglobin.  An appointment has been scheduled for you:  Aleida Merchant PA-C  Monday 9/20/21 at 11:20 AM, please see  below    Follow up with GI- Dr Mcclendon in 1 week.  [facility] will call you with appointment time.  If you do not receive a call from this clinic in a few days, please call     Activity    Your activity upon discharge: activity as tolerated     MD face to face encounter    Documentation of Face to Face and Certification for Home Health Services    I certify that patient: Danyelle Bustillo is under my care and that I, or a nurse practitioner or physician's assistant working with me, had a face-to-face encounter that meets the physician face-to-face encounter requirements with this patient on: 9/17/2021.    This encounter with the patient was in whole, or in part, for the following medical condition, which is the primary reason for home health care: Deconditioning, liver cirrhosis .    I certify that, based on my findings, the following services are medically necessary home health services: Nursing, Occupational Therapy, and Physical Therapy.    My clinical findings support the need for the above services because: Nurse is needed: To provide assessment and oversight required in the home to assure adherence to the medical plan due to: complex medical problems. and To provide caregiver training to assist with: medication management, home safety, Occupational Therapy Services are needed to assess and treat cognitive ability and address ADL safety due to impairment in balance, and Physical Therapy Services are needed to assess and treat the following functional impairments: balance, gait.    Further, I certify that my clinical findings support that this patient is homebound (i.e. absences from home require considerable and taxing effort and are for medical reasons or [employer] services or infrequently or of short duration when for other reasons) because: Requires assistance of another person or specialized equipment to access medical services because patient: Requires supervision of another for safe  transfer...    Based on the above findings. I certify that this patient is confined to the home and needs intermittent skilled nursing care, physical therapy and/or speech therapy.  The patient is under my care, and I have initiated the establishment of the plan of care.  This patient will be followed by a physician who will periodically review the plan of care.  Physician/Provider to provide follow up care: [facility], [facility] physician (the Medicare certified [place] provider): Albina Arango MD  Physician Signature: See electronic signature associated with these discharge orders.  Date: 9/17/2021     Walker Order    DME Documentation:   Describe the reason for need to support medical necessity: gait instability.     I, the undersigned, certify that the above prescribed supplies are medically necessary for this patient and is both reasonable and necessary in reference to accepted standards of medical and necessary in reference to accepted standards of medical practice in the treatment of this patient's condition and is not prescribed as a convenience.     Diet    Follow this diet upon discharge: Orders Placed This Encounter      Snacks/Supplements Adult: Other; 10: carnation; 2: ensure; Between Meals      Fluid restriction 1200 ML FLUID      2 Gram Sodium Diet       Significant Results and Procedures   Most Recent 3 CBC's:Recent Labs   Lab Test 09/16/21  1443 09/16/21  0619 09/16/21  0010 09/15/21  1226 09/15/21  0646 09/14/21  2111 09/14/21  0750 09/13/21  0551 09/13/21  0551   WBC  --   --   --   --  7.9  --  7.0  --  9.3   HGB 8.2* 7.8* 7.8*   < > 7.8*   < > 6.9*   < > 6.5*   MCV  --   --   --   --  101*  --  104*  --  104*   PLT  --   --   --   --  128*  --  144*  --  150    < > = values in this interval not displayed.     Most Recent 3 BMP's:Recent Labs   Lab Test 09/17/21  0849 09/16/21  1443 09/16/21  0619 09/15/21  0646 09/15/21  0646   *  --  129*  129*  --  130*    POTASSIUM 3.5 3.6 3.3*  3.3*   < > 4.1   CHLORIDE 103  --  100  101  --  103   CO2 23  --  20  21  --  22   BUN 3*  --  4*  3*  --  3*   CR 0.71  --  0.73  0.70  --  0.64   ANIONGAP 6  --  9  7  --  5   BRIANA 8.1*  --  8.1*  7.9*  --  7.6*   GLC 98  --  116*  116*  --  101*    < > = values in this interval not displayed.     Most Recent 2 LFT's:Recent Labs   Lab Test 09/16/21  0619 09/15/21  0646   AST 36 39   ALT 14 14   ALKPHOS 83 102   BILITOTAL 4.3* 4.6*     Most Recent 3 INR's:Recent Labs   Lab Test 09/17/21  0848 09/16/21  0917 09/15/21  0646   INR 2.41* 2.06* 2.31*     Most Recent 3 Creatinines:Recent Labs   Lab Test 09/17/21  0849 09/16/21  0619 09/15/21  0646   CR 0.71 0.73  0.70 0.64     Most Recent 3 Hemoglobins:Recent Labs   Lab Test 09/16/21  1443 09/16/21  0619 09/16/21  0010   HGB 8.2* 7.8* 7.8*     Most Recent TSH and T4:Recent Labs   Lab Test 09/11/21  1725   TSH 4.64*   T4 1.14     Most Recent Hemoglobin A1c:No lab results found.  Most Recent 6 glucoses:Recent Labs   Lab Test 09/17/21  0849 09/16/21  0619 09/15/21  0646 09/14/21  0750 09/13/21  0551 09/12/21  0626   GLC 98 116*  116* 101* 116* 101* 79     Most Recent Urinalysis:No lab results found.  Most Recent Anemia Panel:Recent Labs   Lab Test 09/16/21  1443 09/15/21  1226 09/15/21  0646 09/14/21  0750 09/13/21  0551 09/12/21  0626 09/11/21  2345 09/11/21  1725 09/11/21  1725   WBC  --   --  7.9   < > 9.3   < > 11.3*   < > 13.0*   HGB 8.2*   < > 7.8*   < > 6.5*   < > 7.4*   < > 8.7*   HCT  --   --  21.8*   < > 17.9*   < > 20.4*   < > 24.5*   MCV  --   --  101*   < > 104*   < > 104*   < > 105*   PLT  --   --  128*   < > 150   < > 161   < > 200   IRON  --   --   --   --   --   --   --   --  111   IRONSAT  --   --   --   --   --   --   --   --  101*   RETICABSCT  --   --   --   --   --   --  0.071  --   --    RETP  --   --   --   --   --   --  3.6*  --   --    FEB  --   --   --   --   --   --   --   --  110*   MARICEL  --   --   --   --   --    --   --   --  2,860*   B12  --   --   --   --  1,279*  --  1,306*   < >  --    FOLIC  --   --   --   --  5.1*  --  3.8*   < >  --     < > = values in this interval not displayed.   ,   Results for orders placed or performed during the hospital encounter of 09/11/21   Abdomen US, complete    Narrative    US ABDOMEN COMPLETE 9/11/2021 9:19 PM    CLINICAL HISTORY: Evaluate for ascites, liver disease, new jaundice,  abdominal distention and bilateral leg swelling.    TECHNIQUE: Complete abdominal ultrasound.    COMPARISON: None.    FINDINGS:    GALLBLADDER: Gallstones are present in the gallbladder. Gallbladder  wall thickening measures 4 mm but is nonspecific in the setting of  moderate ascites.    BILE DUCTS: No biliary dilatation. The common duct measures 5 mm.    LIVER: Lobulated liver contour concerning for cirrhosis. No focal  mass. Increased echogenicity of the liver is consistent with fatty  infiltration.    RIGHT KIDNEY: Normal size. Normal echogenicity with no hydronephrosis  or mass.     LEFT KIDNEY: Normal size. Normal echogenicity with no hydronephrosis  or mass.     SPLEEN: Normal.    PANCREAS: The visualized portions are normal.    AORTA: Normal in caliber.     IVC: Normal where visualized.    Moderate ascites.      Impression    IMPRESSION:  1.  Fatty infiltration liver with nodular liver contour concerning for  cirrhosis. Moderate ascites.    2.  Cholelithiasis with gallbladder wall thickening, nonspecific in  the setting of ascites. Follow-up hepatobiliary scan could be  performed to assess for cystic and common bile duct patency.    US LI-RADS Category: US-1 Negative.    ELA BERNARD MD         SYSTEM ID:  [id number]   US Lower Extremity Venous Duplex Bilateral    Narrative    US BILATERAL LOWER EXTREMITY VENOUS DUPLEX ULTRASOUND  9/11/2021 8:51  PM    CLINICAL HISTORY: Check for deep vein thrombosis, bilateral leg  swelling.  TECHNIQUE: Venous Duplex ultrasound of bilateral lower extremities  with  and without compression, augmentation and duplex. Color flow and  spectral Doppler with waveform analysis performed.    COMPARISON: None.    FINDINGS: Exam includes the common femoral, femoral, popliteal veins  as well as segmentally visualized deep calf veins and greater  saphenous vein.     RIGHT: No deep vein thrombosis. No superficial thrombophlebitis. No  popliteal cyst.    LEFT: No deep vein thrombosis. No superficial thrombophlebitis. No  popliteal cyst.      Impression    IMPRESSION:  No deep venous thrombosis in the bilateral lower extremities.    ELA BERNARD MD         SYSTEM ID:  [id number]   XR Chest Port 1 View    Narrative    XR PORTABLE CHEST ONE VIEW   9/11/2021 6:32 PM     HISTORY: Cough, check for pneumonia.    COMPARISON: None.      Impression    IMPRESSION: Portable chest. Lungs are clear. Heart is normal in size.  No pneumothorax. No definite pleural effusions.    ELA BERNARD MD         SYSTEM ID:  [id number]   US Paracentesis    Narrative    US PARACENTESIS 9/13/2021 3:22 PM    CLINICAL HISTORY: HIGH VOLUME paracentesis with or without diagnostic  fluid analysis with labs to be drawn if ordered. Total paracentesis  volume as much as possible.    PROCEDURE: Informed consent obtained. Time out performed. The abdomen  was prepped and draped in a sterile fashion. 10 mL of 1% lidocaine was  infused into local soft tissues. A 5 Yi catheter system was  introduced into the abdominal ascites under ultrasound guidance.    4 liters of clear fluid were removed and sent to lab if requested.    Patient tolerated procedure well.    Ultrasound imaging was obtained and placed in the patient's permanent  medical record.      Impression    IMPRESSION:  1.  Status post ultrasound-guided paracentesis.    BELGICA RODRIGUEZ MD         SYSTEM ID:  [id number]   US Paracentesis    Narrative    ULTRASOUND PARACENTESIS 9/16/2021 4:27 PM     HISTORY: Ascites, increased abdominal distention.    FINDINGS: Ultrasound was used  to evaluate for the presence and best  approach for paracentesis. Written and oral informed consent was  obtained. A pause for the cause procedure to verify the correct  patient and correct procedure. The skin overlying the right lower  quadrant was prepped and draped in the usual sterile fashion. The  subcutaneous tissues were anesthetized with 10 mL 1% Lidocaine. A  catheter was advanced into the peritoneal space and 2 L of  straw  colored fluid was drained. There were no immediate complications.  Ultrasound images were permanently stored.  Patient left the  ultrasound suite in satisfactory condition.      Impression    IMPRESSION: Technically successful paracentesis without immediate  complications.    LIZBETH CHOU MD         SYSTEM ID:  [id number]   Echocardiogram Complete     Value    LVEF  65-70%    Narrative    [id number]  [id number]  [id number]  [id number]^MARISSA^RACHEL^[place]     [facility]  Echocardiography Laboratory  [address] [place] [address]     Name: JENISE BURNS  MRN: [id number]  : 1965  Study Date: 2021 09:06 AM  Age: 55 yrs  Gender: Female  Patient Location: [facility]  Reason For Study: CHF  Ordering Physician: RACHEL ANN  Performed By: Liz Odonnell     BSA: 1.8 m2  Height: 66 in  Weight: 155 lb  HR: 122  BP: 113/73 mmHg  ______________________________________________________________________________  Procedure  Complete Portable Echo Adult.  ______________________________________________________________________________  Interpretation Summary     The left ventricle is normal in size.  Left ventricular systolic function is normal.  The visual ejection fraction is 65-70%.  Normal left ventricular wall motion  The right ventricle is normal in structure, function and size.  Doppler findings do not suggest pulmonary hypertension.  Unable to assess mean RA pressure due to technically difficult study.  The rhythm was sinus tachycardia.  There is no comparison  study available.  ______________________________________________________________________________  Left Ventricle  The left ventricle is normal in size. There is normal left ventricular wall  thickness. Left ventricular systolic function is normal. Grade I or early  diastolic dysfunction. The visual ejection fraction is 65-70%. Normal left  ventricular wall motion.     Right Ventricle  The right ventricle is normal in structure, function and size.     Atria  Normal left atrial size. Right atrial size is normal. There is no atrial shunt  seen.     Mitral Valve  The mitral valve is normal in structure and function. There is trace mitral  regurgitation.     Tricuspid Valve  The tricuspid valve is normal in structure and function. There is trace to  mild tricuspid regurgitation. Unable to assess mean RA pressure due to  technically difficult study. Doppler findings do not suggest pulmonary  hypertension. The right ventricular systolic pressure is approximated at 27.6  mmHg plus the right atrial pressure.     Aortic Valve  The aortic valve is normal in structure and function. No aortic regurgitation  is present. No aortic stenosis is present.     Pulmonic Valve  The pulmonic valve is not well seen, but is grossly normal. There is trace  pulmonic valvular regurgitation.     Vessels  Normal size aorta.     Pericardium  There is no pericardial effusion.     Rhythm  The rhythm was sinus tachycardia.  ______________________________________________________________________________  MMode/2D Measurements & Calculations     IVSd: 1.3 cm  LVIDd: 4.2 cm  LVIDs: 3.3 cm  LVPWd: 1.1 cm  FS: 21.8 %  LV mass(C)d: 181.9 grams  LV mass(C)dI: 101.4 grams/m2  Ao root diam: 3.2 cm  LA dimension: 4.2 cm  asc Aorta Diam: 2.8 cm  LA/Ao: 1.3  LVOT diam: 2.2 cm  LVOT area: 3.7 cm2  LA Volume (BP): 47.2 ml  LA Volume Index (BP): 26.4 ml/m2  RWT: 0.54     Doppler Measurements & Calculations  MV E max severino: 100.0 cm/sec  MV A max severino: 122.0 cm/sec  MV  E/A: 0.82  PA V2 max: 157.5 cm/sec  PA max P.9 mmHg  PA acc time: 0.11 sec  TR max severino: 262.8 cm/sec  TR max P.6 mmHg  E/E' av.0  Lateral E/e': 15.3  Medial E/e': 12.7     ______________________________________________________________________________  Report approved by: Manas Urias 2021 10:04 AM               Discharge Medications   Current Discharge Medication List      START taking these medications    Details   folic acid (FOLVITE) 1 MG tablet Take 1 tablet (1 mg) by mouth daily    Associated Diagnoses: Alcohol abuse      furosemide (LASIX) 40 MG tablet Take 1 tablet (40 mg) by mouth daily  Qty: 30 tablet, Refills: 0    Comments: [facility] phone number [phone].  Associated Diagnoses: Ascites of liver      lactulose (CHRONULAC) 10 GM/15ML solution Take 30 mLs (20 g) by mouth 2 times daily for 3 days  Qty: 180 mL, Refills: 0    Comments: Future refills by PCP Dr. Carrasco savannah Gurrola with phone number [phone].  Associated Diagnoses: Ascites of liver      multivitamin w/minerals (THERA-VIT-M) tablet Take 1 tablet by mouth daily    Associated Diagnoses: Alcohol abuse      pantoprazole (PROTONIX) 40 MG EC tablet Take 1 tablet (40 mg) by mouth every morning (before breakfast)  Qty: 30 tablet, Refills: 0    Comments: Future refills [facility] with phone number [phone].  Associated Diagnoses: Anemia, unspecified type      spironolactone (ALDACTONE) 100 MG tablet Take 1 tablet (100 mg) by mouth daily  Qty: 30 tablet, Refills: 0    Comments: Future refills [facility] with phone number [phone].  Associated Diagnoses: Ascites of liver           Allergies   No Known Allergies

## 2021-09-17 NOTE — PLAN OF CARE
Occupational Therapy Discharge Summary    Reason for therapy discharge:    Discharged to home with home therapy.    Progress towards therapy goal(s). See goals on Care Plan in [facility] electronic health record for goal details.  Goals partially met.  Barriers to achieving goals:   discharge from facility.    Therapy recommendation(s):    Continued therapy is recommended.  Rationale/Recommendations:  Pt below baseline requiring stand by assist for functional mobility and ADLs. Pt scored 24/30 on SLUMS indicating possible mild cognitive impairment. Recommend d/c home with A in all IADLs (ie meal prep, shopping, med mngt). Pt lives with boyfriend who is gone during the daytime hours at work, but has neighbors nearby that are available. Recommend home RN for med mngt as needed and home OT for cognition and home safety eval. .        **Pt not seen by writer on this date, note written based on previous treating OT's note and recommendations.

## 2021-09-17 NOTE — PROGRESS NOTES
.Discharge    Patient discharged to home via car with significant other  Care plan note  completed    Listed belongings gathered and given to patient (including from security/pharmacy). Yes  Care Plan and Patient education resolved: Yes  Prescriptions if needed, hard copies sent with patient  Yes  Medication Bin checked and emptied on discharge Yes  SW/care coordinator/charge RN aware of discharge: Yes

## 2021-09-17 NOTE — DISCHARGE INSTRUCTIONS
A referral has been sent to [facility] for RN, Physical and Occupational therapy.  Their phone number is ([phone])

## 2021-09-17 NOTE — PLAN OF CARE
Summary: Hyponatremia, ascites, new dx Cirrhosis      DATE & TIME: 9/16/21-9/17/21 8401-7583  Cognitive Concerns/ Orientation : A/Ox4  BEHAVIOR & AGGRESSION TOOL COLOR: Green   CIWA: 0, 0, 0  ABNL VS/O2: VSS except tachycardic on RA  MOBILITY: SBA with a GB and walker up to BR overnight  PAIN MANAGMENT: denies pain.   DIET: 2g sodium diet, 1200 fluid restriction   BOWEL/BLADDER: Up to bathroom  ABNL LAB/BG: NA, pending AM labs  DRAIN/DEVICES: PIV SL- getting albumin IV 3x a day  TELEMETRY RHYTHM: Sinus tach  SKIN: pale, jaundiced; sclera yellow.   TESTS/PROCEDURES:  NA  D/C DAY/GOALS/PLACE: Likely home today  OTHER IMPORTANT INFO: Abdomen distended r/t ascites. Reported no nausea or abdominal discomfort. Reported no pain. No signs of bleeding noted.

## 2021-09-17 NOTE — PLAN OF CARE
Summary: Hyponatremia, ascites, new dx Cirrhosis      DATE & TIME: 9/17/21 ([id number])  Cognitive Concerns/ Orientation : A/Ox4  BEHAVIOR & AGGRESSION TOOL COLOR: Green   CIWA: 0, 0  ABNL VS/O2: VSS except tachycardic on RA  MOBILITY: SBA with a GB and walker up to BR, ambulated in hallways  PAIN MANAGMENT: denies pain.   DIET: 2g sodium diet, 1200 fluid restriction   BOWEL/BLADDER: Up to bathroom, can be incontinent at times  ABNL LAB/BG:Ammonia 38, INR 2.41, , Hemoglobin 8.2  DRAIN/DEVICES: PIV discontinued at discharge  TELEMETRY RHYTHM: Sinus tachy  SKIN: pale, jaundiced; sclera yellow.   TESTS/PROCEDURES:  NA  D/C DAY/GOALS/PLACE: Home today with home care   OTHER IMPORTANT INFO: Abdomen distended r/t ascites. Reported no nausea or abdominal discomfort.No signs of bleeding noted. Reviewed discharge instructions with patient with understanding.  Prescriptions given

## 2021-09-18 LAB — BACTERIA FLD CULT: NO GROWTH

## 2021-09-21 ENCOUNTER — TELEPHONE (OUTPATIENT)
Dept: NURSING | Facility: CLINIC | Age: 56
End: 2021-09-21

## 2021-09-21 ENCOUNTER — OFFICE VISIT (OUTPATIENT)
Dept: INTERNAL MEDICINE | Facility: CLINIC | Age: 56
End: 2021-09-21
Payer: MEDICAID

## 2021-09-21 VITALS
TEMPERATURE: 98.1 F | SYSTOLIC BLOOD PRESSURE: 124 MMHG | BODY MASS INDEX: 24.89 KG/M2 | WEIGHT: 154.2 LBS | DIASTOLIC BLOOD PRESSURE: 78 MMHG | RESPIRATION RATE: 18 BRPM | OXYGEN SATURATION: 99 % | HEART RATE: 121 BPM

## 2021-09-21 DIAGNOSIS — K70.31 ALCOHOLIC CIRRHOSIS OF LIVER WITH ASCITES (H): ICD-10-CM

## 2021-09-21 DIAGNOSIS — D53.8 OTHER SPECIFIED NUTRITIONAL ANEMIAS: ICD-10-CM

## 2021-09-21 DIAGNOSIS — K74.60 DECOMPENSATION OF CIRRHOSIS OF LIVER (H): Primary | ICD-10-CM

## 2021-09-21 DIAGNOSIS — Z53.9 DIAGNOSIS NOT YET DEFINED: Primary | ICD-10-CM

## 2021-09-21 DIAGNOSIS — R18.8 ASCITES OF LIVER: ICD-10-CM

## 2021-09-21 DIAGNOSIS — E87.6 HYPOKALEMIA: ICD-10-CM

## 2021-09-21 DIAGNOSIS — K72.90 DECOMPENSATION OF CIRRHOSIS OF LIVER (H): Primary | ICD-10-CM

## 2021-09-21 LAB
ALBUMIN SERPL-MCNC: 3.3 G/DL (ref 3.4–5)
ALP SERPL-CCNC: 119 U/L (ref 40–150)
ALT SERPL W P-5'-P-CCNC: 21 U/L (ref 0–50)
ANION GAP SERPL CALCULATED.3IONS-SCNC: 7 MMOL/L (ref 3–14)
AST SERPL W P-5'-P-CCNC: 59 U/L (ref 0–45)
BILIRUB SERPL-MCNC: 5.6 MG/DL (ref 0.2–1.3)
BUN SERPL-MCNC: 5 MG/DL (ref 7–30)
CALCIUM SERPL-MCNC: 7.3 MG/DL (ref 8.5–10.1)
CHLORIDE BLD-SCNC: 96 MMOL/L (ref 94–109)
CO2 SERPL-SCNC: 27 MMOL/L (ref 20–32)
CREAT SERPL-MCNC: 0.69 MG/DL (ref 0.52–1.04)
GFR SERPL CREATININE-BSD FRML MDRD: >90 ML/MIN/1.73M2
GLUCOSE BLD-MCNC: 106 MG/DL (ref 70–99)
HGB BLD-MCNC: 9.5 G/DL (ref 11.7–15.7)
POTASSIUM BLD-SCNC: 2.7 MMOL/L (ref 3.4–5.3)
PROT SERPL-MCNC: 6.5 G/DL (ref 6.8–8.8)
SODIUM SERPL-SCNC: 130 MMOL/L (ref 133–144)

## 2021-09-21 PROCEDURE — 36415 COLL VENOUS BLD VENIPUNCTURE: CPT | Performed by: PHYSICIAN ASSISTANT

## 2021-09-21 PROCEDURE — 85018 HEMOGLOBIN: CPT | Performed by: PHYSICIAN ASSISTANT

## 2021-09-21 PROCEDURE — 80053 COMPREHEN METABOLIC PANEL: CPT | Performed by: PHYSICIAN ASSISTANT

## 2021-09-21 PROCEDURE — 99204 OFFICE O/P NEW MOD 45 MIN: CPT | Performed by: PHYSICIAN ASSISTANT

## 2021-09-21 RX ORDER — LACTULOSE 10 G/15ML
20 SOLUTION ORAL 2 TIMES DAILY
Qty: 1892 ML | Refills: 3 | Status: SHIPPED | OUTPATIENT
Start: 2021-09-21

## 2021-09-21 NOTE — PROGRESS NOTES
"    Assessment & Plan     Decompensation of cirrhosis of liver (H)    - Comprehensive metabolic panel (BMP + Alb, Alk Phos, ALT, AST, Total. Bili, TP); Future  - Hemoglobin; Future    Alcoholic cirrhosis of liver with ascites (H)    - Comprehensive metabolic panel (BMP + Alb, Alk Phos, ALT, AST, Total. Bili, TP); Future  - Hemoglobin; Future    Other specified nutritional anemias    - Hemoglobin; Future    Ascites of liver    - lactulose (CHRONULAC) 10 GM/15ML solution; Take 30 mLs (20 g) by mouth 2 times daily             BMI:   Estimated body mass index is 24.89 kg/m  as calculated from the following:    Height as of 9/11/21: 1.676 m (5' 6\").    Weight as of this encounter: 69.9 kg (154 lb 3.2 oz).       Patient Instructions   [facility] at ([phone])  To follow up on liver disease       Return in about 2 weeks (around 10/5/2021) for establish care with new primary doctor .    Aleida Merchant PA-C  [facility]    Teddy Bassett is a 55 year old who presents for the following health issues  accompanied by her  :    [facility]       Hospital Follow-up Visit:    Hospital/Nursing Home/IP Rehab Facility: [facility]  Date of Admission: 09/11/2021  Date of Discharge: 09/17/2021  Reason(s) for Admission: Alcohol abuse, Hyponatremia, ascites of liver      Was your hospitalization related to COVID-19? No   Problems taking medications regularly:  None  Medication changes since discharge: None  Problems adhering to non-medication therapy:  None    Summary of hospitalization:  [facility] discharge summary reviewed  Diagnostic Tests/Treatments reviewed.  Follow up needed: GI- Dr Mcclendon  Other Healthcare Providers Involved in Patient s Care:         Homecare  Update since discharge: stable.       Post Discharge Medication Reconciliation: discharge medications reconciled, continue medications without change.  Plan of " care communicated with patient and family              Do ok at home since discharge  Needs refill on lactulose.   Has not hear from Dr Anthony office about any appointment for follow up.  Needs labs today BMP/Liver panel, and hemoglobin     Review of Systems   Constitutional, HEENT, cardiovascular, pulmonary, gi and gu systems are negative, except as otherwise noted.      Objective    /78   Pulse (!) 121   Temp 98.1  F (36.7  C) (Tympanic)   Resp 18   Wt 69.9 kg (154 lb 3.2 oz)   SpO2 99%   BMI 24.89 kg/m    Body mass index is 24.89 kg/m .  Physical Exam   GENERAL: healthy, alert and no distress  NECK: no adenopathy, no asymmetry, masses, or scars and thyroid normal to palpation  RESP: lungs clear to auscultation - no rales, rhonchi or wheezes  CV: regular rates and rhythm and normal S1 S2, no S3 or S4  ABDOMEN: bloated abdomin noted  No focal tenderness on exam   MS: no gross musculoskeletal defects noted, no edema  SKIN: no suspicious lesions or rashes

## 2021-09-21 NOTE — TELEPHONE ENCOUNTER
LUÍS    PT from homecare called stating that PT was eval only, Ot was refused by patient and nursing will eval and treat. Informed that patient does not currently have a PCP and that she is scheduled with an acute provider who can do hospital follow up but not follow as PCP     PT will call patient and discuss establishing with PCP who can follow for homecare    Avila Umaña RN

## 2021-09-22 ENCOUNTER — TELEPHONE (OUTPATIENT)
Dept: NURSING | Facility: CLINIC | Age: 56
End: 2021-09-22

## 2021-09-22 DIAGNOSIS — E87.6 HYPOKALEMIA: Primary | ICD-10-CM

## 2021-09-22 RX ORDER — POTASSIUM CHLORIDE 1500 MG/1
20 TABLET, EXTENDED RELEASE ORAL 2 TIMES DAILY
Qty: 30 TABLET | Refills: 1 | Status: SHIPPED | OUTPATIENT
Start: 2021-09-22

## 2021-09-22 NOTE — TELEPHONE ENCOUNTER
----- Message from Aleida Merchant PA-C sent at 9/22/2021  9:45 AM CDT -----  Potassium is low.  Start potassium supplement Klor con 20 meq daily and recheck potassium in 2 days. Orders placed  Remind patient to schedule with new PCP - establish care in the next 2 weeks as well.

## 2021-09-22 NOTE — RESULT ENCOUNTER NOTE
Potassium is low.  Start potassium supplement Klor con 20 meq daily and recheck potassium in 2 days. Orders placed  Remind patient to schedule with new PCP - establish care in the next 2 weeks as well.

## 2021-09-22 NOTE — TELEPHONE ENCOUNTER
Called and informed patient, she will  supplement and get labs redone    She would like a call when lab order is placed to schedule    Lab pended for review    Avila Umaña RN

## 2021-09-23 ENCOUNTER — LAB (OUTPATIENT)
Dept: LAB | Facility: CLINIC | Age: 56
End: 2021-09-23
Payer: MEDICAID

## 2021-09-23 DIAGNOSIS — E87.6 HYPOKALEMIA: ICD-10-CM

## 2021-09-23 LAB — POTASSIUM BLD-SCNC: 3.1 MMOL/L (ref 3.4–5.3)

## 2021-09-23 PROCEDURE — 36415 COLL VENOUS BLD VENIPUNCTURE: CPT

## 2021-09-23 PROCEDURE — 84132 ASSAY OF SERUM POTASSIUM: CPT

## 2021-10-08 ENCOUNTER — HOSPITAL ENCOUNTER (EMERGENCY)
Facility: CLINIC | Age: 56
Discharge: HOME | End: 2021-10-08
Attending: EMERGENCY MEDICINE | Admitting: EMERGENCY MEDICINE
Payer: COMMERCIAL

## 2021-10-08 ENCOUNTER — APPOINTMENT (OUTPATIENT)
Dept: ULTRASOUND IMAGING | Facility: CLINIC | Age: 56
End: 2021-10-08
Attending: EMERGENCY MEDICINE
Payer: COMMERCIAL

## 2021-10-08 VITALS
SYSTOLIC BLOOD PRESSURE: 112 MMHG | DIASTOLIC BLOOD PRESSURE: 64 MMHG | BODY MASS INDEX: 24.91 KG/M2 | RESPIRATION RATE: 16 BRPM | OXYGEN SATURATION: 97 % | HEART RATE: 122 BPM | HEIGHT: 66 IN | WEIGHT: 155 LBS | TEMPERATURE: 97.3 F

## 2021-10-08 DIAGNOSIS — K70.31 ALCOHOLIC CIRRHOSIS OF LIVER WITH ASCITES (H): ICD-10-CM

## 2021-10-08 DIAGNOSIS — R00.0 SINUS TACHYCARDIA: ICD-10-CM

## 2021-10-08 LAB
% LINING CELLS, BODY FLUID: 61 %
ALBUMIN FLD-MCNC: 0.3 G/DL
ALBUMIN SERPL-MCNC: 2.6 G/DL (ref 3.4–5)
ALP SERPL-CCNC: 186 U/L (ref 40–150)
ALT SERPL W P-5'-P-CCNC: 25 U/L (ref 0–50)
ANION GAP SERPL CALCULATED.3IONS-SCNC: 10 MMOL/L (ref 3–14)
APPEARANCE FLD: CLEAR
AST SERPL W P-5'-P-CCNC: 63 U/L (ref 0–45)
ATRIAL RATE - MUSE: 125 BPM
BASOPHILS # BLD AUTO: 0.1 10E3/UL (ref 0–0.2)
BASOPHILS NFR BLD AUTO: 1 %
BASOPHILS NFR FLD MANUAL: 1 %
BILIRUB SERPL-MCNC: 5.6 MG/DL (ref 0.2–1.3)
BUN SERPL-MCNC: 4 MG/DL (ref 7–30)
CALCIUM SERPL-MCNC: 8.5 MG/DL (ref 8.5–10.1)
CHLORIDE BLD-SCNC: 97 MMOL/L (ref 94–109)
CO2 SERPL-SCNC: 23 MMOL/L (ref 20–32)
COLOR FLD: YELLOW
CREAT SERPL-MCNC: 0.76 MG/DL (ref 0.52–1.04)
DIASTOLIC BLOOD PRESSURE - MUSE: NORMAL MMHG
EOSINOPHIL # BLD AUTO: 0.2 10E3/UL (ref 0–0.7)
EOSINOPHIL NFR BLD AUTO: 1 %
ERYTHROCYTE [DISTWIDTH] IN BLOOD BY AUTOMATED COUNT: 16.4 % (ref 10–15)
ETHANOL SERPL-MCNC: <0.01 G/DL
GFR SERPL CREATININE-BSD FRML MDRD: 89 ML/MIN/1.73M2
GLUCOSE BLD-MCNC: 106 MG/DL (ref 70–99)
HCT VFR BLD AUTO: 29.4 % (ref 35–47)
HGB BLD-MCNC: 10 G/DL (ref 11.7–15.7)
HOLD SPECIMEN: NORMAL
IMM GRANULOCYTES # BLD: 0.1 10E3/UL
IMM GRANULOCYTES NFR BLD: 1 %
INR PPP: 1.99 (ref 0.85–1.15)
INTERPRETATION ECG - MUSE: NORMAL
LYMPHOCYTES # BLD AUTO: 2.1 10E3/UL (ref 0.8–5.3)
LYMPHOCYTES NFR BLD AUTO: 16 %
LYMPHOCYTES NFR FLD MANUAL: 9 %
MCH RBC QN AUTO: 33.8 PG (ref 26.5–33)
MCHC RBC AUTO-ENTMCNC: 34 G/DL (ref 31.5–36.5)
MCV RBC AUTO: 99 FL (ref 78–100)
MONOCYTES # BLD AUTO: 1.2 10E3/UL (ref 0–1.3)
MONOCYTES NFR BLD AUTO: 9 %
MONOS+MACROS NFR FLD MANUAL: 27 %
NEUTROPHILS # BLD AUTO: 9.6 10E3/UL (ref 1.6–8.3)
NEUTROPHILS NFR BLD AUTO: 72 %
NEUTS BAND NFR FLD MANUAL: 3 %
NRBC # BLD AUTO: 0 10E3/UL
NRBC BLD AUTO-RTO: 0 /100
P AXIS - MUSE: 48 DEGREES
PLATELET # BLD AUTO: 291 10E3/UL (ref 150–450)
POTASSIUM BLD-SCNC: 3.5 MMOL/L (ref 3.4–5.3)
PR INTERVAL - MUSE: 120 MS
PROT FLD-MCNC: 1.2 G/DL
PROT SERPL-MCNC: 7.9 G/DL (ref 6.8–8.8)
QRS DURATION - MUSE: 74 MS
QT - MUSE: 338 MS
QTC - MUSE: 487 MS
R AXIS - MUSE: 28 DEGREES
RBC # BLD AUTO: 2.96 10E6/UL (ref 3.8–5.2)
SODIUM SERPL-SCNC: 130 MMOL/L (ref 133–144)
SYSTOLIC BLOOD PRESSURE - MUSE: NORMAL MMHG
T AXIS - MUSE: 9 DEGREES
VENTRICULAR RATE- MUSE: 125 BPM
WBC # BLD AUTO: 13.2 10E3/UL (ref 4–11)
WBC # FLD AUTO: 239 /UL

## 2021-10-08 PROCEDURE — 89050 BODY FLUID CELL COUNT: CPT | Performed by: EMERGENCY MEDICINE

## 2021-10-08 PROCEDURE — 82077 ASSAY SPEC XCP UR&BREATH IA: CPT | Performed by: EMERGENCY MEDICINE

## 2021-10-08 PROCEDURE — 87070 CULTURE OTHR SPECIMN AEROBIC: CPT | Performed by: EMERGENCY MEDICINE

## 2021-10-08 PROCEDURE — 85610 PROTHROMBIN TIME: CPT | Performed by: EMERGENCY MEDICINE

## 2021-10-08 PROCEDURE — 99285 EMERGENCY DEPT VISIT HI MDM: CPT | Mod: 25

## 2021-10-08 PROCEDURE — 89051 BODY FLUID CELL COUNT: CPT | Performed by: EMERGENCY MEDICINE

## 2021-10-08 PROCEDURE — 36415 COLL VENOUS BLD VENIPUNCTURE: CPT | Performed by: EMERGENCY MEDICINE

## 2021-10-08 PROCEDURE — 85025 COMPLETE CBC W/AUTO DIFF WBC: CPT | Performed by: EMERGENCY MEDICINE

## 2021-10-08 PROCEDURE — 82042 OTHER SOURCE ALBUMIN QUAN EA: CPT | Performed by: EMERGENCY MEDICINE

## 2021-10-08 PROCEDURE — 96360 HYDRATION IV INFUSION INIT: CPT | Mod: 59

## 2021-10-08 PROCEDURE — 93005 ELECTROCARDIOGRAM TRACING: CPT

## 2021-10-08 PROCEDURE — 84157 ASSAY OF PROTEIN OTHER: CPT | Performed by: EMERGENCY MEDICINE

## 2021-10-08 PROCEDURE — 272N000706 US PARACENTESIS

## 2021-10-08 PROCEDURE — 80053 COMPREHEN METABOLIC PANEL: CPT | Performed by: EMERGENCY MEDICINE

## 2021-10-08 PROCEDURE — 999N000154 HC STATISTIC RADIOLOGY XRAY, US, CT, MAR, NM

## 2021-10-08 PROCEDURE — 258N000003 HC RX IP 258 OP 636: Performed by: EMERGENCY MEDICINE

## 2021-10-08 RX ORDER — LIDOCAINE 40 MG/G
CREAM TOPICAL
Status: DISCONTINUED | OUTPATIENT
Start: 2021-10-08 | End: 2021-10-08 | Stop reason: HOSPADM

## 2021-10-08 RX ORDER — ALBUMIN (HUMAN) 12.5 G/50ML
12.5 SOLUTION INTRAVENOUS ONCE
Status: CANCELLED | OUTPATIENT
Start: 2021-10-08 | End: 2021-10-08

## 2021-10-08 RX ORDER — LIDOCAINE HYDROCHLORIDE 10 MG/ML
10 INJECTION, SOLUTION EPIDURAL; INFILTRATION; INTRACAUDAL; PERINEURAL ONCE
Status: COMPLETED | OUTPATIENT
Start: 2021-10-08 | End: 2021-10-08

## 2021-10-08 RX ADMIN — SODIUM CHLORIDE, POTASSIUM CHLORIDE, SODIUM LACTATE AND CALCIUM CHLORIDE 500 ML: 600; 310; 30; 20 INJECTION, SOLUTION INTRAVENOUS at 18:05

## 2021-10-08 RX ADMIN — LIDOCAINE HYDROCHLORIDE 10 ML: 10 INJECTION, SOLUTION EPIDURAL; INFILTRATION; INTRACAUDAL; PERINEURAL at 16:20

## 2021-10-08 ASSESSMENT — ENCOUNTER SYMPTOMS
CONSTIPATION: 0
DIFFICULTY URINATING: 0
ABDOMINAL DISTENTION: 1
VOMITING: 1
FEVER: 0

## 2021-10-08 ASSESSMENT — MIFFLIN-ST. JEOR: SCORE: 1319.83

## 2021-10-08 NOTE — ED PROVIDER NOTES
History   Chief Complaint:  Bloated       HPI   Danyelle Bustillo is a 55 year old female with history of liver cirrhosis, recently admitted on 9/11/21-9/17/21 s/p paracentesis x2 during that admission. Today she presents for recurrent abdominal bloating/distention. She reports that she has not been eating correctly as she is on a sodium restricted diet and drinking too many fluids but she was put on a 1200 ml restriction. States that she has been urinating normally and having at least a bowel movement a day. She is taking lactulose as prescribed. The patient did have an episode of dry heaving with mucus production but no significant vomiting. She otherwise feels overall improved with the exception of her bloating. Was seen on 9/21/21 at her clinic and prescribed potassium which she has finished. She reports she is taking her medications as prescribed. She denies fevers. She denies any alcohol use since discharge. She reports that she has not seen a specialist since she was discharged. She denies any other symptoms.      Review of Systems   Constitutional: Negative for fever.   Gastrointestinal: Positive for abdominal distention and vomiting (since resolved). Negative for constipation.   Genitourinary: Negative for difficulty urinating.   All other systems reviewed and are negative.    Allergies:  The patient has no known allergies.     Medications:  Lasix  Chronulac  Protonix  Aldactone    Past Medical History:     Hyponatremia  Liver cirrhosis  Ascites  Alcohol use disorder  Cholelithiasis    Past Surgical History:    Endoscopic Upper GI     Social History:  The patient presents to the ED with       Physical Exam     Patient Vitals for the past 24 hrs:   BP Temp Temp src Pulse Resp SpO2 Height Weight   10/08/21 1900 119/64 -- -- (!) 124 -- 98 % -- --   10/08/21 1730 122/81 -- -- (!) 125 -- 97 % -- --   10/08/21 1700 120/83 -- -- (!) 122 -- 97 % -- --   10/08/21 1629 123/70 -- -- (!) 125 -- -- -- --  "  10/08/21 1622 137/87 -- -- (!) 128 16 -- -- --   10/08/21 1530 -- -- -- -- -- 99 % -- --   10/08/21 1500 (!) 130/98 -- -- (!) 129 -- 100 % -- --   10/08/21 1429 (!) 140/82 97.3  F (36.3  C) Temporal 120 20 98 % 1.676 m (5' 6\") 70.3 kg (155 lb)       Physical Exam  General: Appears chronically unwell, nontoxic.  Resting comfortably  Head:  Scalp, face, and head appear normal  Eyes:  Pupils are equal, round    Conjunctivae non-injected and sclerae white  ENT:    The external nose is normal    Pinnae are normal  Neck:  Normal range of motion    There is no rigidity noted    Trachea is in the midline  CV:  Regular rate and rhythm     Normal S1/S2, no S3/S4    No murmur or rub. Radial pulses 2+ bilaterally.  Resp:  Lungs are clear and equal bilaterally  There is no tachypnea    No increased work of breathing    No rales, wheezing, or rhonchi  GI:  Abdomen is soft, no rigidity or guarding    Moderate distension. No mass. + ascites    No tenderness or rebound tenderness   MS:  Normal muscular tone    Symmetric motor strength    No lower extremity edema. No calf swelling or tenderness.  Skin:  No rash or acute skin lesions noted  Neuro: Awake and alert  Speech is normal and fluent  Moves all extremities spontaneously  Psych:  Normal affect. Appropriate interactions.      Emergency Department Course   ECG  ECG obtained at 1559, ECG read at 1630  Sinus tachycardia. Low voltage QRS. Abnormal ECG.   No significant changes as compared to prior, dated 9/12/21.  Rate 125 bpm. UT interval 120 ms. QRS duration 74 ms. QT/QTc 338/487 ms. P-R-T axes 48 28 9.     Imaging:  US Paracentesis:  3.2 liters of clear fluid were removed and sent to lab. Status post ultrasound-guided paracentesis.    Report per radiology    Laboratory:  CBC: WBC: 13.2 (H), HGB: 10.0 (L), PLT: 291    CMP: Glucose 106 (H), Sodium: 130 (L), Urea Nitrogen: 4 (L), Bilirubin Total: 5.6 (H), Albumin: 2.6 (L), Alkaline Phosphatase: 186 (H), AST: 63 (H), o/w WNL " (Creatinine: 0.76)    INR: 1.99 (H)    Alcohol ethyl: <0.015    Cell count with differential fluid: Yellow, clear, WBC: 239    Albumin fluid: 0.3    Protein fluid: 1.2    Cell count with differential fluid: WNL    Ascites fluid bacterial culture: Pending    Emergency Department Course:  Reviewed:  I reviewed nursing notes, vitals and past medical history    ED Course as of Oct 08 1928   Fri Oct 08, 2021   1542 I obtained history and examined the patient as noted above.       1831 I rechecked and updated the patient      1926 I updated the patient prior to discharge        Interventions:  1620 1% Xylocaine 10 mL subcutaneous  1805 Lactated Ringers 500 mL IV    Disposition:  The patient was discharged to home.     Impression & Plan     Medical Decision Making:  Danyelle Bustillo is a 55 year old female with a history of recently diagnosed alcoholic cirrhosis and ascites who presents with recurrent abdominal distention and bloating.  On my evaluation she appears chronically ill but otherwise nontoxic and comfortable.  Abdominal exam reveals palpable ascites and distention but is otherwise benign without evidence of peritonitis or acute surgical emergency.  She has no abdominal tenderness or fevers to indicate SBP.  She notes that her discomfort is due to the distention of her abdomen due to fluid buildup.  No other peripheral edema.  No significant respiratory symptoms.  Work-up in the emergency department is consistent with her known history of alcoholic cirrhosis.  Bilirubin and LFTs are stable.  Chronic stable hyponatremia.  Anemia improving since discharge.  Patient noted to be tachycardic on arrival and throughout her ED course.  In reviewing prior notes including vitals at the time of hospital discharge and recent clinic visit it appears that she is chronically tachycardic to the 120s likely due to her underlying liver disease.  EKG in the emergency department shows sinus tachycardia without evidence of other  dysrhythmia.  Therapeutic ultrasound-guided paracentesis was performed by radiology and 3.2 L of ascites fluid was removed and sent to the lab for testing.  Patient symptoms were improved following paracentesis.  She had no complications.  Given her tachycardia there is concern for intravascular volume depletion therefore she was given a small bolus of IV crystalloid however her heart rate remained largely unchanged. IV albumin not indicated based on system guidelines. No evidence of acute infectious process or sepsis at this time.  I stressed the importance of very close follow-up with gastroenterology/hepatologist as well as her primary care physician.  She should return immediately to the emergency department for worsening symptoms including fever, worsening dizziness or weakness syncope or any other worsening.  I stressed the importance of fluid restricted diet as well as complete abstinence from alcohol use.  The patient was agreeable with the plan of care.  Close return precautions were provided and she was discharged in stable and improved condition.    Diagnosis:    ICD-10-CM    1. Alcoholic cirrhosis of liver with ascites (H)  K70.31    2. Sinus tachycardia  R00.0      Scribe Disclosure:  HIEN, Zak Medrano, am serving as a scribe at 3:16 PM on 10/8/2021 to document services personally performed by Xavier Coates MD based on my observations and the provider's statements to me.                  Xavier Coates MD  10/09/21 1128

## 2021-10-08 NOTE — PROGRESS NOTES
Procedure Note:  Reason for admission: Paracentesis  Time of arrival: 16:15    Pre-procedure assessment complete: Yes    Procedure explained. All questions & concerns addressed: Yes  Consent: obtained    Paracentesis:   Patient tolerated well. VSS.   3200 cc yellow fluid removed from abdomen w/o difficulty.   Bandaid applied to site - CDI.     16:36  Pt back to ER room 9. Detailed report given to RN.

## 2021-10-13 LAB — BACTERIA FLD CULT: NO GROWTH

## 2021-10-29 ENCOUNTER — TELEPHONE (OUTPATIENT)
Dept: INTERNAL MEDICINE | Facility: CLINIC | Age: 56
End: 2021-10-29

## 2021-10-29 NOTE — TELEPHONE ENCOUNTER
Call from Sherri from [facility]. States that patient had a start of care and then declined services. Needing a provider to sign off on the start of care only. Per telephone encounter dated 9/21, sounds like Aleida Merchant is unable to follow for home care orders and that is who patient saw in clinic.     Routing to provider to review/advise:   1. Are you able to sign for home care start of care visit only?   2. If unable, is this something that Dr. Astorga could sign for?     Sherri is requesting a call back or just to have the order/paperwork signed. States this was from 9/21.

## 2021-10-29 NOTE — TELEPHONE ENCOUNTER
I guess I can sign for the one time visit.   But I am not her primary nor will I be and she has yet to schedule with a PCP

## 2021-11-02 PROCEDURE — 88305 TISSUE EXAM BY PATHOLOGIST: CPT | Mod: TC,ORL | Performed by: INTERNAL MEDICINE

## 2021-11-03 ENCOUNTER — LAB REQUISITION (OUTPATIENT)
Dept: LAB | Facility: CLINIC | Age: 56
End: 2021-11-03
Payer: COMMERCIAL

## 2021-11-03 DIAGNOSIS — K57.30 DIVERTICULOSIS OF LARGE INTESTINE WITHOUT PERFORATION OR ABSCESS WITHOUT BLEEDING: ICD-10-CM

## 2021-11-03 DIAGNOSIS — D50.9 IRON DEFICIENCY ANEMIA, UNSPECIFIED: ICD-10-CM

## 2021-11-04 LAB
PATH REPORT.COMMENTS IMP SPEC: NORMAL
PATH REPORT.COMMENTS IMP SPEC: NORMAL
PATH REPORT.FINAL DX SPEC: NORMAL
PATH REPORT.GROSS SPEC: NORMAL
PATH REPORT.MICROSCOPIC SPEC OTHER STN: NORMAL
PATH REPORT.RELEVANT HX SPEC: NORMAL
PHOTO IMAGE: NORMAL

## 2021-11-04 PROCEDURE — 88305 TISSUE EXAM BY PATHOLOGIST: CPT | Mod: 26

## 2021-11-11 ENCOUNTER — TELEPHONE (OUTPATIENT)
Dept: INTERNAL MEDICINE | Facility: CLINIC | Age: 56
End: 2021-11-11
Payer: COMMERCIAL

## 2021-11-11 NOTE — TELEPHONE ENCOUNTER
Monse KELLY from [facility] received home care orders stating Aleida Dixon will not sign and pt needs to establish care.    Since Aleida gave verbal orders she needs to sign the forms for home health certification and revision of plan of care.    If questions you can call Monse or Sherri at [phone].    Madeleine MARTINEZ RN  EP Triage

## 2021-11-12 ENCOUNTER — TELEPHONE (OUTPATIENT)
Dept: INTERNAL MEDICINE | Facility: CLINIC | Age: 56
End: 2021-11-12
Payer: COMMERCIAL

## 2021-11-12 DIAGNOSIS — Z53.9 DIAGNOSIS NOT YET DEFINED: Primary | ICD-10-CM

## 2021-11-12 PROCEDURE — G0180 MD CERTIFICATION HHA PATIENT: HCPCS | Performed by: PHYSICIAN ASSISTANT

## 2021-11-12 NOTE — TELEPHONE ENCOUNTER
Signed plan of care signed by Aleida Merchant and faxed to [facility] [phone] per TC Radha Segura RN

## 2021-11-12 NOTE — TELEPHONE ENCOUNTER
Sherri from [facility] called in requesting a signed discharge order from services since the pt declined. Is anyone able to sign off on this. Aleida signed off on initial order but the pt hasn't establish care. Reports she will fax forms over to sign.     Can we leave a detailed message on this number? YES  Phone number patient can be reached at: Other phone number:  Sherri [phone]    Kristine Segura RN  [facility] Triage        Kristine Segura RN

## 2021-11-12 NOTE — TELEPHONE ENCOUNTER
Signed discharged orders faxed to [facility]- [phone]    Kristine Segura RN

## 2021-11-12 NOTE — TELEPHONE ENCOUNTER
I am not sure what I am supposed to be doing with this message.  See Epic note from 10/29/21

## 2021-12-09 DIAGNOSIS — Z11.59 ENCOUNTER FOR SCREENING FOR OTHER VIRAL DISEASES: Primary | ICD-10-CM

## 2021-12-11 ENCOUNTER — LAB (OUTPATIENT)
Dept: URGENT CARE | Facility: URGENT CARE | Age: 56
End: 2021-12-11
Payer: COMMERCIAL

## 2021-12-11 DIAGNOSIS — Z11.59 ENCOUNTER FOR SCREENING FOR OTHER VIRAL DISEASES: ICD-10-CM

## 2021-12-11 PROCEDURE — U0005 INFEC AGEN DETEC AMPLI PROBE: HCPCS

## 2021-12-11 PROCEDURE — U0003 INFECTIOUS AGENT DETECTION BY NUCLEIC ACID (DNA OR RNA); SEVERE ACUTE RESPIRATORY SYNDROME CORONAVIRUS 2 (SARS-COV-2) (CORONAVIRUS DISEASE [COVID-19]), AMPLIFIED PROBE TECHNIQUE, MAKING USE OF HIGH THROUGHPUT TECHNOLOGIES AS DESCRIBED BY CMS-2020-01-R: HCPCS

## 2021-12-12 LAB — SARS-COV-2 RNA RESP QL NAA+PROBE: NEGATIVE

## 2021-12-13 ENCOUNTER — TELEPHONE (OUTPATIENT)
Dept: MEDSURG UNIT | Facility: CLINIC | Age: 56
End: 2021-12-13
Payer: COMMERCIAL

## 2021-12-13 NOTE — TELEPHONE ENCOUNTER
Pre-Procedure Negative COVID Test Results    Results Reviewed  The patient has a negative COVID test result within the required timeframe for the scheduled procedure.     No COVID pre-call needed.     Nhi North RN

## 2021-12-14 ENCOUNTER — LAB (OUTPATIENT)
Dept: LAB | Facility: CLINIC | Age: 56
End: 2021-12-14
Attending: INTERNAL MEDICINE
Payer: COMMERCIAL

## 2021-12-14 ENCOUNTER — HOSPITAL ENCOUNTER (OUTPATIENT)
Dept: ULTRASOUND IMAGING | Facility: CLINIC | Age: 56
End: 2021-12-14
Attending: PHYSICIAN ASSISTANT
Payer: COMMERCIAL

## 2021-12-14 ENCOUNTER — HOSPITAL ENCOUNTER (OUTPATIENT)
Facility: CLINIC | Age: 56
Discharge: HOME | End: 2021-12-14
Admitting: RADIOLOGY
Payer: COMMERCIAL

## 2021-12-14 VITALS
HEART RATE: 101 BPM | SYSTOLIC BLOOD PRESSURE: 118 MMHG | OXYGEN SATURATION: 100 % | RESPIRATION RATE: 16 BRPM | DIASTOLIC BLOOD PRESSURE: 71 MMHG

## 2021-12-14 DIAGNOSIS — K70.31 ALCOHOLIC CIRRHOSIS OF LIVER WITH ASCITES (H): ICD-10-CM

## 2021-12-14 LAB
ALBUMIN SERPL-MCNC: 2 G/DL (ref 3.4–5)
ALP SERPL-CCNC: 83 U/L (ref 40–150)
ALT SERPL W P-5'-P-CCNC: 22 U/L (ref 0–50)
ANION GAP SERPL CALCULATED.3IONS-SCNC: 8 MMOL/L (ref 3–14)
APTT PPP: 40 SECONDS (ref 22–38)
AST SERPL W P-5'-P-CCNC: 48 U/L (ref 0–45)
BASOPHILS # BLD AUTO: 0 10E3/UL (ref 0–0.2)
BASOPHILS NFR BLD AUTO: 1 %
BILIRUB SERPL-MCNC: 3.4 MG/DL (ref 0.2–1.3)
BUN SERPL-MCNC: 7 MG/DL (ref 7–30)
CALCIUM SERPL-MCNC: 8.6 MG/DL (ref 8.5–10.1)
CHLORIDE BLD-SCNC: 104 MMOL/L (ref 94–109)
CO2 SERPL-SCNC: 24 MMOL/L (ref 20–32)
CREAT SERPL-MCNC: 0.56 MG/DL (ref 0.52–1.04)
EOSINOPHIL # BLD AUTO: 0.1 10E3/UL (ref 0–0.7)
EOSINOPHIL NFR BLD AUTO: 1 %
ERYTHROCYTE [DISTWIDTH] IN BLOOD BY AUTOMATED COUNT: 14.3 % (ref 10–15)
GFR SERPL CREATININE-BSD FRML MDRD: >90 ML/MIN/1.73M2
GLUCOSE BLD-MCNC: 108 MG/DL (ref 70–99)
HCT VFR BLD AUTO: 33.4 % (ref 35–47)
HGB BLD-MCNC: 11.3 G/DL (ref 11.7–15.7)
IMM GRANULOCYTES # BLD: 0 10E3/UL
IMM GRANULOCYTES NFR BLD: 1 %
INR PPP: 1.56 (ref 0.85–1.15)
LYMPHOCYTES # BLD AUTO: 1.2 10E3/UL (ref 0.8–5.3)
LYMPHOCYTES NFR BLD AUTO: 20 %
MCH RBC QN AUTO: 32.2 PG (ref 26.5–33)
MCHC RBC AUTO-ENTMCNC: 33.8 G/DL (ref 31.5–36.5)
MCV RBC AUTO: 95 FL (ref 78–100)
MONOCYTES # BLD AUTO: 0.6 10E3/UL (ref 0–1.3)
MONOCYTES NFR BLD AUTO: 10 %
NEUTROPHILS # BLD AUTO: 4 10E3/UL (ref 1.6–8.3)
NEUTROPHILS NFR BLD AUTO: 67 %
NRBC # BLD AUTO: 0 10E3/UL
NRBC BLD AUTO-RTO: 0 /100
PLATELET # BLD AUTO: 237 10E3/UL (ref 150–450)
POTASSIUM BLD-SCNC: 3 MMOL/L (ref 3.4–5.3)
PROT SERPL-MCNC: 7 G/DL (ref 6.8–8.8)
RBC # BLD AUTO: 3.51 10E6/UL (ref 3.8–5.2)
SODIUM SERPL-SCNC: 136 MMOL/L (ref 133–144)
TSH SERPL DL<=0.005 MIU/L-ACNC: 4.22 MU/L (ref 0.4–4)
WBC # BLD AUTO: 5.9 10E3/UL (ref 4–11)

## 2021-12-14 PROCEDURE — 36415 COLL VENOUS BLD VENIPUNCTURE: CPT

## 2021-12-14 PROCEDURE — 85004 AUTOMATED DIFF WBC COUNT: CPT

## 2021-12-14 PROCEDURE — 49083 ABD PARACENTESIS W/IMAGING: CPT

## 2021-12-14 PROCEDURE — 84443 ASSAY THYROID STIM HORMONE: CPT

## 2021-12-14 PROCEDURE — 82040 ASSAY OF SERUM ALBUMIN: CPT

## 2021-12-14 PROCEDURE — 85610 PROTHROMBIN TIME: CPT

## 2021-12-14 PROCEDURE — 999N000154 HC STATISTIC RADIOLOGY XRAY, US, CT, MAR, NM

## 2021-12-14 PROCEDURE — 85730 THROMBOPLASTIN TIME PARTIAL: CPT

## 2021-12-14 RX ORDER — ALBUMIN (HUMAN) 12.5 G/50ML
12.5 SOLUTION INTRAVENOUS ONCE
Status: DISCONTINUED | OUTPATIENT
Start: 2021-12-14 | End: 2021-12-14 | Stop reason: HOSPADM

## 2021-12-14 RX ORDER — LIDOCAINE HYDROCHLORIDE 10 MG/ML
10 INJECTION, SOLUTION EPIDURAL; INFILTRATION; INTRACAUDAL; PERINEURAL ONCE
Status: COMPLETED | OUTPATIENT
Start: 2021-12-14 | End: 2021-12-14

## 2021-12-14 RX ORDER — LIDOCAINE 40 MG/G
CREAM TOPICAL
Status: DISCONTINUED | OUTPATIENT
Start: 2021-12-14 | End: 2021-12-14 | Stop reason: HOSPADM

## 2021-12-14 RX ADMIN — LIDOCAINE HYDROCHLORIDE 10 ML: 10 INJECTION, SOLUTION EPIDURAL; INFILTRATION; INTRACAUDAL; PERINEURAL at 13:08

## 2021-12-14 NOTE — PROGRESS NOTES
RADIOLOGY PROCEDURE NOTE  Patient name: Danyelle Bustillo  MRN: [id number]  : 1965    Pre-procedure diagnosis: Ascites  Post-procedure diagnosis: Same    Procedure Date/Time: 2021  1:12 PM  Procedure: Paracentesis  Estimated blood loss: None  Specimen(s) collected with description: Ascites.  The patient tolerated the procedure well with no immediate complications.    See imaging dictation for procedural details and findings.    Provider name: Tom John MD  Assistant(s):None

## 2021-12-14 NOTE — PROGRESS NOTES
Pt here in [place] dept for paracentesis.  Procedure explained, AVS reviewed with pt pre proc.  VSS, reports no pain.  Prev Para's without complications.    PATIENT/VISITOR WELLNESS SCREENING    Step 1 Patient Screening    1. In the last month, have you been in contact with someone who was confirmed or suspected to have Coronavirus/COVID-19? No    2. Do you have the following symptoms?  Fever/Chills? No   Cough? No   Shortness of breath? No   New loss of taste or smell? No  Sore throat? No  Muscle or body aches? No  Headaches? No  Fatigue? No  Vomiting or diarrhea? No      Paracentesis complete.  Tolerated well.  Total volume removed 5.6 L,  Light josé in color.  From right side of abd.  Dermabond and  Band aid to site, VSS, no pain at time of discharge.  Pt will go to OP lab for lab work before going home today.

## 2021-12-14 NOTE — DISCHARGE INSTRUCTIONS

## (undated) RX ORDER — FENTANYL CITRATE 50 UG/ML
INJECTION, SOLUTION INTRAMUSCULAR; INTRAVENOUS
Status: DISPENSED
Start: 2021-09-15